# Patient Record
Sex: MALE | Race: BLACK OR AFRICAN AMERICAN | NOT HISPANIC OR LATINO | Employment: OTHER | URBAN - METROPOLITAN AREA
[De-identification: names, ages, dates, MRNs, and addresses within clinical notes are randomized per-mention and may not be internally consistent; named-entity substitution may affect disease eponyms.]

---

## 2021-05-02 ENCOUNTER — HOSPITAL ENCOUNTER (EMERGENCY)
Facility: HOSPITAL | Age: 56
Discharge: PSYCHIATRIC OR SUBSTANCE USE FACILITY | End: 2021-05-02
Attending: EMERGENCY MEDICINE
Payer: MEDICAID

## 2021-05-02 VITALS
SYSTOLIC BLOOD PRESSURE: 117 MMHG | TEMPERATURE: 98 F | HEART RATE: 83 BPM | OXYGEN SATURATION: 96 % | DIASTOLIC BLOOD PRESSURE: 72 MMHG | RESPIRATION RATE: 15 BRPM

## 2021-05-02 DIAGNOSIS — R07.9 CHEST PAIN: ICD-10-CM

## 2021-05-02 DIAGNOSIS — R31.9 HEMATURIA, UNSPECIFIED TYPE: ICD-10-CM

## 2021-05-02 DIAGNOSIS — R45.851 SUICIDAL IDEATION: Primary | ICD-10-CM

## 2021-05-02 LAB
ALBUMIN SERPL BCP-MCNC: 4 G/DL (ref 3.5–5.2)
ALP SERPL-CCNC: 65 U/L (ref 55–135)
ALT SERPL W/O P-5'-P-CCNC: 17 U/L (ref 10–44)
AMPHET+METHAMPHET UR QL: NEGATIVE
ANION GAP SERPL CALC-SCNC: 15 MMOL/L (ref 8–16)
APAP SERPL-MCNC: <3 UG/ML (ref 10–20)
AST SERPL-CCNC: 42 U/L (ref 10–40)
BACTERIA #/AREA URNS AUTO: ABNORMAL /HPF
BARBITURATES UR QL SCN>200 NG/ML: NEGATIVE
BASOPHILS # BLD AUTO: 0.03 K/UL (ref 0–0.2)
BASOPHILS NFR BLD: 0.7 % (ref 0–1.9)
BENZODIAZ UR QL SCN>200 NG/ML: ABNORMAL
BILIRUB SERPL-MCNC: 0.6 MG/DL (ref 0.1–1)
BILIRUB UR QL STRIP: NEGATIVE
BNP SERPL-MCNC: 12 PG/ML (ref 0–99)
BUN SERPL-MCNC: 22 MG/DL (ref 6–20)
BZE UR QL SCN: ABNORMAL
CALCIUM SERPL-MCNC: 9.5 MG/DL (ref 8.7–10.5)
CANNABINOIDS UR QL SCN: ABNORMAL
CHLORIDE SERPL-SCNC: 104 MMOL/L (ref 95–110)
CLARITY UR REFRACT.AUTO: ABNORMAL
CO2 SERPL-SCNC: 21 MMOL/L (ref 23–29)
COLOR UR AUTO: ABNORMAL
CREAT SERPL-MCNC: 1.1 MG/DL (ref 0.5–1.4)
CREAT UR-MCNC: 412 MG/DL (ref 23–375)
CTP QC/QA: YES
DIFFERENTIAL METHOD: ABNORMAL
EOSINOPHIL # BLD AUTO: 0.1 K/UL (ref 0–0.5)
EOSINOPHIL NFR BLD: 2.5 % (ref 0–8)
ERYTHROCYTE [DISTWIDTH] IN BLOOD BY AUTOMATED COUNT: 15.4 % (ref 11.5–14.5)
EST. GFR  (AFRICAN AMERICAN): >60 ML/MIN/1.73 M^2
EST. GFR  (NON AFRICAN AMERICAN): >60 ML/MIN/1.73 M^2
ETHANOL SERPL-MCNC: <10 MG/DL
ETHANOL UR-MCNC: <10 MG/DL
GLUCOSE SERPL-MCNC: 100 MG/DL (ref 70–110)
GLUCOSE UR QL STRIP: NEGATIVE
HCT VFR BLD AUTO: 37.4 % (ref 40–54)
HGB BLD-MCNC: 11.9 G/DL (ref 14–18)
HGB UR QL STRIP: ABNORMAL
HYALINE CASTS UR QL AUTO: 1 /LPF
IMM GRANULOCYTES # BLD AUTO: 0.02 K/UL (ref 0–0.04)
IMM GRANULOCYTES NFR BLD AUTO: 0.5 % (ref 0–0.5)
KETONES UR QL STRIP: ABNORMAL
LEUKOCYTE ESTERASE UR QL STRIP: ABNORMAL
LYMPHOCYTES # BLD AUTO: 1.4 K/UL (ref 1–4.8)
LYMPHOCYTES NFR BLD: 35.9 % (ref 18–48)
MCH RBC QN AUTO: 23.6 PG (ref 27–31)
MCHC RBC AUTO-ENTMCNC: 31.8 G/DL (ref 32–36)
MCV RBC AUTO: 74 FL (ref 82–98)
METHADONE UR QL SCN>300 NG/ML: NEGATIVE
MICROSCOPIC COMMENT: ABNORMAL
MONOCYTES # BLD AUTO: 0.6 K/UL (ref 0.3–1)
MONOCYTES NFR BLD: 14 % (ref 4–15)
NEUTROPHILS # BLD AUTO: 1.9 K/UL (ref 1.8–7.7)
NEUTROPHILS NFR BLD: 46.4 % (ref 38–73)
NITRITE UR QL STRIP: NEGATIVE
NRBC BLD-RTO: 0 /100 WBC
OPIATES UR QL SCN: NEGATIVE
PCP UR QL SCN>25 NG/ML: NEGATIVE
PH UR STRIP: 5 [PH] (ref 5–8)
PLATELET # BLD AUTO: 258 K/UL (ref 150–450)
PMV BLD AUTO: 10.4 FL (ref 9.2–12.9)
POTASSIUM SERPL-SCNC: 3.5 MMOL/L (ref 3.5–5.1)
PROT SERPL-MCNC: 7.7 G/DL (ref 6–8.4)
PROT UR QL STRIP: ABNORMAL
RBC # BLD AUTO: 5.04 M/UL (ref 4.6–6.2)
RBC #/AREA URNS AUTO: >100 /HPF (ref 0–4)
SALICYLATES SERPL-MCNC: <5 MG/DL (ref 15–30)
SARS-COV-2 RDRP RESP QL NAA+PROBE: NEGATIVE
SODIUM SERPL-SCNC: 140 MMOL/L (ref 136–145)
SP GR UR STRIP: >=1.03 (ref 1–1.03)
T4 FREE SERPL-MCNC: 1.12 NG/DL (ref 0.71–1.51)
TOXICOLOGY INFORMATION: ABNORMAL
TROPONIN I SERPL DL<=0.01 NG/ML-MCNC: 0.01 NG/ML (ref 0–0.03)
TSH SERPL DL<=0.005 MIU/L-ACNC: 4.29 UIU/ML (ref 0.4–4)
URN SPEC COLLECT METH UR: ABNORMAL
WBC # BLD AUTO: 4.01 K/UL (ref 3.9–12.7)
WBC #/AREA URNS AUTO: 16 /HPF (ref 0–5)

## 2021-05-02 PROCEDURE — 84484 ASSAY OF TROPONIN QUANT: CPT | Performed by: EMERGENCY MEDICINE

## 2021-05-02 PROCEDURE — 93005 ELECTROCARDIOGRAM TRACING: CPT

## 2021-05-02 PROCEDURE — 82077 ASSAY SPEC XCP UR&BREATH IA: CPT | Performed by: STUDENT IN AN ORGANIZED HEALTH CARE EDUCATION/TRAINING PROGRAM

## 2021-05-02 PROCEDURE — 63600175 PHARM REV CODE 636 W HCPCS: Performed by: STUDENT IN AN ORGANIZED HEALTH CARE EDUCATION/TRAINING PROGRAM

## 2021-05-02 PROCEDURE — 99285 EMERGENCY DEPT VISIT HI MDM: CPT | Mod: 25

## 2021-05-02 PROCEDURE — 93010 EKG 12-LEAD: ICD-10-PCS | Mod: ,,, | Performed by: INTERNAL MEDICINE

## 2021-05-02 PROCEDURE — 99285 EMERGENCY DEPT VISIT HI MDM: CPT | Mod: CS,,, | Performed by: EMERGENCY MEDICINE

## 2021-05-02 PROCEDURE — 87086 URINE CULTURE/COLONY COUNT: CPT | Performed by: EMERGENCY MEDICINE

## 2021-05-02 PROCEDURE — 87389 HIV-1 AG W/HIV-1&-2 AB AG IA: CPT | Performed by: EMERGENCY MEDICINE

## 2021-05-02 PROCEDURE — 96361 HYDRATE IV INFUSION ADD-ON: CPT

## 2021-05-02 PROCEDURE — 85025 COMPLETE CBC W/AUTO DIFF WBC: CPT | Performed by: EMERGENCY MEDICINE

## 2021-05-02 PROCEDURE — 86803 HEPATITIS C AB TEST: CPT | Performed by: EMERGENCY MEDICINE

## 2021-05-02 PROCEDURE — 81001 URINALYSIS AUTO W/SCOPE: CPT | Performed by: EMERGENCY MEDICINE

## 2021-05-02 PROCEDURE — 84443 ASSAY THYROID STIM HORMONE: CPT | Performed by: EMERGENCY MEDICINE

## 2021-05-02 PROCEDURE — 86703 HIV-1/HIV-2 1 RESULT ANTBDY: CPT | Performed by: EMERGENCY MEDICINE

## 2021-05-02 PROCEDURE — 80143 DRUG ASSAY ACETAMINOPHEN: CPT | Performed by: STUDENT IN AN ORGANIZED HEALTH CARE EDUCATION/TRAINING PROGRAM

## 2021-05-02 PROCEDURE — 51798 US URINE CAPACITY MEASURE: CPT

## 2021-05-02 PROCEDURE — 87536 HIV-1 QUANT&REVRSE TRNSCRPJ: CPT | Performed by: STUDENT IN AN ORGANIZED HEALTH CARE EDUCATION/TRAINING PROGRAM

## 2021-05-02 PROCEDURE — 80307 DRUG TEST PRSMV CHEM ANLYZR: CPT | Performed by: STUDENT IN AN ORGANIZED HEALTH CARE EDUCATION/TRAINING PROGRAM

## 2021-05-02 PROCEDURE — 80053 COMPREHEN METABOLIC PANEL: CPT | Performed by: EMERGENCY MEDICINE

## 2021-05-02 PROCEDURE — 83880 ASSAY OF NATRIURETIC PEPTIDE: CPT | Performed by: EMERGENCY MEDICINE

## 2021-05-02 PROCEDURE — U0002 COVID-19 LAB TEST NON-CDC: HCPCS | Performed by: STUDENT IN AN ORGANIZED HEALTH CARE EDUCATION/TRAINING PROGRAM

## 2021-05-02 PROCEDURE — 25000003 PHARM REV CODE 250: Performed by: EMERGENCY MEDICINE

## 2021-05-02 PROCEDURE — 99285 PR EMERGENCY DEPT VISIT,LEVEL V: ICD-10-PCS | Mod: CS,,, | Performed by: EMERGENCY MEDICINE

## 2021-05-02 PROCEDURE — 93010 ELECTROCARDIOGRAM REPORT: CPT | Mod: ,,, | Performed by: INTERNAL MEDICINE

## 2021-05-02 PROCEDURE — 96374 THER/PROPH/DIAG INJ IV PUSH: CPT

## 2021-05-02 PROCEDURE — 86361 T CELL ABSOLUTE COUNT: CPT | Performed by: STUDENT IN AN ORGANIZED HEALTH CARE EDUCATION/TRAINING PROGRAM

## 2021-05-02 PROCEDURE — 84439 ASSAY OF FREE THYROXINE: CPT | Performed by: EMERGENCY MEDICINE

## 2021-05-02 PROCEDURE — 80179 DRUG ASSAY SALICYLATE: CPT | Performed by: STUDENT IN AN ORGANIZED HEALTH CARE EDUCATION/TRAINING PROGRAM

## 2021-05-02 RX ORDER — ASPIRIN 325 MG
325 TABLET ORAL
Status: COMPLETED | OUTPATIENT
Start: 2021-05-02 | End: 2021-05-02

## 2021-05-02 RX ORDER — LORAZEPAM 2 MG/ML
0.5 INJECTION INTRAMUSCULAR ONCE
Status: COMPLETED | OUTPATIENT
Start: 2021-05-02 | End: 2021-05-02

## 2021-05-02 RX ORDER — LORAZEPAM 1 MG/1
1 TABLET ORAL
Status: DISCONTINUED | OUTPATIENT
Start: 2021-05-02 | End: 2021-05-02

## 2021-05-02 RX ADMIN — LORAZEPAM 0.5 MG: 2 INJECTION INTRAMUSCULAR; INTRAVENOUS at 04:05

## 2021-05-02 RX ADMIN — SODIUM CHLORIDE, SODIUM LACTATE, POTASSIUM CHLORIDE, AND CALCIUM CHLORIDE 1000 ML: .6; .31; .03; .02 INJECTION, SOLUTION INTRAVENOUS at 04:05

## 2021-05-02 RX ADMIN — ASPIRIN 325 MG ORAL TABLET 325 MG: 325 PILL ORAL at 04:05

## 2021-05-03 LAB
BACTERIA UR CULT: NO GROWTH
CD3+CD4+ CELLS # BLD: 75 CELLS/UL (ref 300–1400)
CD3+CD4+ CELLS NFR BLD: 6.2 % (ref 28–57)
HCV AB SERPL QL IA: NEGATIVE
HIV 1+2 AB+HIV1 P24 AG SERPL QL IA: POSITIVE
HIV SUPPLEMENTAL ASSAY INTERPRETATION: ABNORMAL
HIV-1 RESULT: POSITIVE
HIV-2 RESULT: NEGATIVE

## 2021-05-05 LAB — HIV1 RNA # PLAS NAA DL=20: 4940 COPIES/ML

## 2021-06-14 PROBLEM — F33.42 RECURRENT MAJOR DEPRESSIVE DISORDER, IN FULL REMISSION: Status: ACTIVE | Noted: 2021-06-14

## 2021-06-14 PROBLEM — M79.645 PAIN OF LEFT MIDDLE FINGER: Status: ACTIVE | Noted: 2021-06-14

## 2021-06-14 PROBLEM — Z86.39 HISTORY OF DIABETES MELLITUS: Status: ACTIVE | Noted: 2021-06-14

## 2021-06-14 PROBLEM — G47.00 INSOMNIA: Status: ACTIVE | Noted: 2021-06-14

## 2021-06-14 PROBLEM — F29 PSYCHOSIS: Status: ACTIVE | Noted: 2021-06-14

## 2021-06-14 PROBLEM — Z21 ASYMPTOMATIC HIV INFECTION: Status: ACTIVE | Noted: 2021-06-14

## 2021-06-14 PROBLEM — Z86.19 HISTORY OF SYPHILIS: Status: ACTIVE | Noted: 2021-06-14

## 2021-07-21 PROBLEM — Z76.0 ENCOUNTER FOR MEDICATION REFILL: Status: ACTIVE | Noted: 2021-07-21

## 2021-08-18 ENCOUNTER — PATIENT OUTREACH (OUTPATIENT)
Dept: ADMINISTRATIVE | Facility: HOSPITAL | Age: 56
End: 2021-08-18

## 2021-08-18 DIAGNOSIS — Z12.12 ENCOUNTER FOR COLORECTAL CANCER SCREENING: Primary | ICD-10-CM

## 2021-08-18 DIAGNOSIS — Z12.11 ENCOUNTER FOR COLORECTAL CANCER SCREENING: Primary | ICD-10-CM

## 2022-03-23 PROBLEM — E11.65 TYPE 2 DIABETES MELLITUS WITH HYPERGLYCEMIA, WITHOUT LONG-TERM CURRENT USE OF INSULIN: Status: ACTIVE | Noted: 2022-03-23

## 2022-03-23 PROBLEM — F17.210 CIGARETTE NICOTINE DEPENDENCE WITHOUT COMPLICATION: Status: ACTIVE | Noted: 2022-03-23

## 2022-03-23 PROBLEM — M62.82 NON-TRAUMATIC RHABDOMYOLYSIS: Status: ACTIVE | Noted: 2022-03-23

## 2022-03-23 PROBLEM — F33.2 SEVERE EPISODE OF RECURRENT MAJOR DEPRESSIVE DISORDER: Status: ACTIVE | Noted: 2021-06-14

## 2022-03-23 PROBLEM — F14.10 COCAINE ABUSE: Status: ACTIVE | Noted: 2022-03-23

## 2022-03-23 PROBLEM — M19.012 LOCALIZED OSTEOARTHRITIS OF LEFT SHOULDER: Status: ACTIVE | Noted: 2022-03-23

## 2022-03-23 PROBLEM — E87.6 HYPOKALEMIA: Status: ACTIVE | Noted: 2022-03-23

## 2022-03-24 PROBLEM — F33.3 SEVERE EPISODE OF RECURRENT MAJOR DEPRESSIVE DISORDER, WITH PSYCHOTIC FEATURES: Status: ACTIVE | Noted: 2021-06-14

## 2022-04-16 PROBLEM — F17.200 TOBACCO USE DISORDER: Status: ACTIVE | Noted: 2022-04-16

## 2022-04-16 PROBLEM — F15.90 STIMULANT USE DISORDER: Status: ACTIVE | Noted: 2022-04-16

## 2022-04-19 PROBLEM — F33.2 MDD (MAJOR DEPRESSIVE DISORDER), RECURRENT EPISODE, SEVERE: Chronic | Status: ACTIVE | Noted: 2021-06-14

## 2022-04-19 PROBLEM — F33.3 SEVERE EPISODE OF RECURRENT MAJOR DEPRESSIVE DISORDER, WITH PSYCHOTIC FEATURES: Chronic | Status: ACTIVE | Noted: 2021-06-14

## 2022-08-31 PROBLEM — S02.652B FRACTURE OF ANGLE OF LEFT MANDIBLE, INITIAL ENCOUNTER FOR OPEN FRACTURE: Status: ACTIVE | Noted: 2022-08-31

## 2023-04-20 ENCOUNTER — HOSPITAL ENCOUNTER (EMERGENCY)
Facility: OTHER | Age: 58
Discharge: HOME | End: 2023-04-20
Attending: EMERGENCY MEDICINE
Payer: MEDICAID

## 2023-04-20 VITALS
BODY MASS INDEX: 20.18 KG/M2 | TEMPERATURE: 99 F | RESPIRATION RATE: 17 BRPM | WEIGHT: 149 LBS | HEIGHT: 72 IN | DIASTOLIC BLOOD PRESSURE: 55 MMHG | OXYGEN SATURATION: 98 % | SYSTOLIC BLOOD PRESSURE: 107 MMHG | HEART RATE: 92 BPM

## 2023-04-20 DIAGNOSIS — E11.49 OTHER DIABETIC NEUROLOGICAL COMPLICATION ASSOCIATED WITH TYPE 2 DIABETES MELLITUS: Primary | ICD-10-CM

## 2023-04-20 DIAGNOSIS — B35.3 TINEA PEDIS, UNSPECIFIED LATERALITY: ICD-10-CM

## 2023-04-20 DIAGNOSIS — M79.673 FOOT PAIN: ICD-10-CM

## 2023-04-20 LAB — POCT GLUCOSE: 85 MG/DL (ref 70–110)

## 2023-04-20 PROCEDURE — 25000003 PHARM REV CODE 250: Performed by: PHYSICIAN ASSISTANT

## 2023-04-20 PROCEDURE — 99283 EMERGENCY DEPT VISIT LOW MDM: CPT

## 2023-04-20 PROCEDURE — 82962 GLUCOSE BLOOD TEST: CPT

## 2023-04-20 RX ORDER — GABAPENTIN 300 MG/1
600 CAPSULE ORAL 3 TIMES DAILY
Status: DISCONTINUED | OUTPATIENT
Start: 2023-04-20 | End: 2023-04-20 | Stop reason: HOSPADM

## 2023-04-20 RX ORDER — DICLOFENAC SODIUM 10 MG/G
2 GEL TOPICAL 4 TIMES DAILY
Qty: 20 G | Refills: 0 | Status: ON HOLD | OUTPATIENT
Start: 2023-04-20 | End: 2023-09-18 | Stop reason: HOSPADM

## 2023-04-20 RX ORDER — GABAPENTIN 300 MG/1
600 CAPSULE ORAL 3 TIMES DAILY
Qty: 180 CAPSULE | Refills: 0 | Status: ON HOLD | OUTPATIENT
Start: 2023-04-20 | End: 2023-09-18 | Stop reason: HOSPADM

## 2023-04-20 RX ORDER — KETOROLAC TROMETHAMINE 10 MG/1
10 TABLET, FILM COATED ORAL
Status: COMPLETED | OUTPATIENT
Start: 2023-04-20 | End: 2023-04-20

## 2023-04-20 RX ADMIN — GABAPENTIN 600 MG: 300 CAPSULE ORAL at 02:04

## 2023-04-20 RX ADMIN — KETOROLAC TROMETHAMINE 10 MG: 10 TABLET, FILM COATED ORAL at 02:04

## 2023-04-20 NOTE — ED PROVIDER NOTES
Source of History:  Patient    Chief complaint:  Foot Swelling (Referred from [facility] c/o right foot pain/swelling that radiates to right calf x 1 month. Denies PMH DVT. No swelling or redness noted. -SOB Denies any other complaints. VSS)      HPI:  Vinicius Atkinson is a 57 y.o. male presenting with past medical history of HIV on antiviral, diabetes mellitus controlled with metformin, history of neuropathy and previous polysubstance abuse with complaints of bilateral foot pain with prevalence to right plantar foot.  Denies any recent falls or trauma.  Denies any concern of retained objects.  Reports he was previously on gabapentin however since a resident of [facility] is no longer taking this medication.  Does describe the pain as sharp stabbing jolts.  Also reports some pins and needle sensation.  Denies any chest pain, calf pain or pain radiation.    This is the extent to the patients complaints today here in the emergency department.    ROS: As per HPI and below:  Constitutional: No fever.  No chills.  Eyes: No visual changes.   ENT: No sore throat. No ear pain.  Urinary: No abnormal urination.  MSK:  Bilateral foot pain  Integument: No rashes or lesions.    Review of patient's allergies indicates:   Allergen Reactions    Tramadol Diarrhea       PMH:  As per HPI and below:  Past Medical History:   Diagnosis Date    Acid reflux     Addiction to drug     Anxiety     Depression     Diabetes mellitus, type 2     History of psychiatric hospitalization     HIV infection     Hx of psychiatric care     Tori     Psychiatric exam requested by authority     Psychiatric problem     Substance abuse     Suicide attempt     Therapy      Past Surgical History:   Procedure Laterality Date    LEG SURGERY      ORIF MANDIBULAR FRACTURE Left 9/9/2022    Procedure: ORIF, FRACTURE, MANDIBLE Reviewed by ROHITH 09/06/22 1201;  Surgeon: Trenton Drew DDS, MD;  Location: [facility] MAIN OR;  Service: Oral Surgery;  Laterality: Left;       Social  History     Tobacco Use    Smoking status: Every Day     Packs/day: 1.00     Years: 10.00     Pack years: 10.00     Types: Cigarettes    Smokeless tobacco: Never   Substance Use Topics    Alcohol use: Not Currently    Drug use: Yes     Types: Marijuana, Cocaine, Heroin     Comment: Last used 6 months ago       Physical Exam:    BP (!) 107/55 (BP Location: Left arm, Patient Position: Sitting)   Pulse 92   Temp 98.5 °F (36.9 °C) (Oral)   Resp 17   Ht 6' (1.829 m)   Wt 67.6 kg (149 lb)   SpO2 98%   BMI 20.21 kg/m²   Nursing note and vital signs reviewed.  Constitutional: No acute distress.  Eyes: No conjunctival injection.  Extraocular muscles are intact.  ENT: Normal phonation.  Musculoskeletal:  Fair range of motion of bilateral lower extremities.  Noted pes planus with tinea like rash to the plantar aspects of both feet and interdigit spaces.  No erythema or edema.  Sensation grossly intact.  Distal pulses intact.  Skin: + rashes seen.  Good turgor.  No abrasions.  No ecchymoses.  Psych: Appropriate, conversant.        I decided to obtain the patient's medical records.    Results for orders placed or performed during the hospital encounter of 04/20/23   POCT glucose   Result Value Ref Range    POCT Glucose 85 70 - 110 mg/dL     Imaging Results              X-Ray Foot Complete Right (Final result)  Result time 04/20/23 12:52:33      Final result by Ze Bolanos MD (04/20/23 12:52:33)                   Impression:      See above      Electronically signed by: Ze Bolanos MD  Date:    04/20/2023  Time:    12:52               Narrative:    EXAMINATION:  XR FOOT COMPLETE 3 VIEW RIGHT    CLINICAL HISTORY:  . Pain in unspecified foot    TECHNIQUE:  AP, lateral, and oblique views of the right foot were performed.    COMPARISON:  None    FINDINGS:  Phalanges metatarsals and tarsal bones are intact.  Mild degenerative changes seen at the 1st MP joint.                                      MDM:    57 y.o.  male with bilateral foot pain.  Physical exam reveals patient well appearing in no distress. Fair range of motion of bilateral lower extremities.  Noted pes planus with tinea like rash to the plantar aspects of both feet and interdigit spaces.  No erythema or edema.  Sensation grossly intact.  Distal pulses intact.    DDX: fracture, sprain, dislocation, neuropathy, cellulitis, tinea, gouty arthritis    ED management: x-ray showing no acute bony process; Toradol given in the ED. It appears patient has been DC from gabapentin and he was unaware.  Emphasized that this is likely the best treatment for his suspected neuropathy.  No further workup felt warranted here as no recent trauma and no findings to suggest acute emergent process or acute infectious process.  We did discuss he has tinea and will also give referral to Podiatry given    Impression/Plan: The primary encounter diagnosis was Other diabetic neurological complication associated with type 2 diabetes mellitus. Diagnoses of Foot pain and Tinea pedis, unspecified laterality were also pertinent to this visit.   Patient cautioned on when to return to ED.  Pt. Understands and agrees with current treatment plan      Diagnostic Impression:    1. Other diabetic neurological complication associated with type 2 diabetes mellitus    2. Foot pain    3. Tinea pedis, unspecified laterality         ED Disposition Condition    Discharge Stable            ED Prescriptions       Medication Sig Dispense Start Date End Date Auth. Provider    gabapentin (NEURONTIN) 300 MG capsule Take 2 capsules (600 mg total) by mouth 3 (three) times daily. 180 capsule 4/20/2023 5/20/2023 RAGHAV Serrato    diclofenac sodium (VOLTAREN) 1 % Gel Apply 2 g topically 4 (four) times daily. 20 g 4/20/2023 -- RAGHAV Serrato          Follow-up Information       Follow up With Specialties Details Why Contact Info    [facility]  Schedule an  appointment as soon as possible for a visit   [facility] [address]            Please pardon typos or dictation errors, as this note was transcribed using [employer] flu[employer] direct dictation software.      RAGHAV Serrato  04/20/23 9683

## 2023-04-20 NOTE — ED TRIAGE NOTES
C/o right foot pain and swelling for the last month. Denies injury. Reports intermittent sharp pain.

## 2023-04-20 NOTE — FIRST PROVIDER EVALUATION
" Emergency Department TeleTriage Encounter Note      CHIEF COMPLAINT    Chief Complaint   Patient presents with    Foot Swelling     Referred from [facility] c/o right foot pain/swelling that radiates to right calf x 1 month. Denies PMH DVT. No swelling or redness noted. -SOB Denies any other complaints. VSS       VITAL SIGNS   Initial Vitals [04/20/23 1129]   BP Pulse Resp Temp SpO2   (!) 107/55 92 17 98.5 °F (36.9 °C) 98 %      MAP       --            ALLERGIES    Review of patient's allergies indicates:   Allergen Reactions    Tramadol Diarrhea       PROVIDER TRIAGE NOTE  This is a teletriage evaluation of a 57 y.o. male presenting to the ED complaining of foot pain. Patient reports right foot pain that radiates into right calf. Symptoms have been present for "months." He has not taken any medications. He denies trauma or injury.     Patient is alert and oriented. He speaks in complete sentences. He is sitting upright in the chair in no distress. Per triage note, no swelling noted.     Initial orders will be placed and care will be transferred to an alternate provider when patient is roomed for a full evaluation. Any additional orders and the final disposition will be determined by that provider.         ORDERS  Labs Reviewed - No data to display    ED Orders (720h ago, onward)      None              Virtual Visit Note: The provider triage portion of this emergency department evaluation and documentation was performed via [employer], a HIPAA-compliant telemedicine application, in concert with a tele-presenter in the room. A face to face patient evaluation with one of my colleagues will occur once the patient is placed in an emergency department room.      DISCLAIMER: This note was prepared with [employer] voice recognition transcription software. Garbled syntax, mangled pronouns, and other bizarre constructions may be attributed to that software system.    "

## 2023-04-21 ENCOUNTER — TELEPHONE (OUTPATIENT)
Dept: ORTHOPEDICS | Facility: CLINIC | Age: 58
End: 2023-04-21
Payer: MEDICAID

## 2023-06-07 ENCOUNTER — HOSPITAL ENCOUNTER (EMERGENCY)
Facility: OTHER | Age: 58
Discharge: HOME | End: 2023-06-07
Attending: EMERGENCY MEDICINE
Payer: MEDICAID

## 2023-06-07 VITALS
RESPIRATION RATE: 18 BRPM | HEIGHT: 72 IN | HEART RATE: 89 BPM | SYSTOLIC BLOOD PRESSURE: 147 MMHG | OXYGEN SATURATION: 95 % | TEMPERATURE: 98 F | BODY MASS INDEX: 21.67 KG/M2 | WEIGHT: 160 LBS | DIASTOLIC BLOOD PRESSURE: 86 MMHG

## 2023-06-07 DIAGNOSIS — M79.671 PAIN IN BOTH FEET: Primary | ICD-10-CM

## 2023-06-07 DIAGNOSIS — M79.672 PAIN IN BOTH FEET: Primary | ICD-10-CM

## 2023-06-07 LAB
ALBUMIN SERPL BCP-MCNC: 3.9 G/DL (ref 3.5–5.2)
ALP SERPL-CCNC: 75 U/L (ref 55–135)
ALT SERPL W/O P-5'-P-CCNC: 37 U/L (ref 10–44)
ANION GAP SERPL CALC-SCNC: 10 MMOL/L (ref 8–16)
AST SERPL-CCNC: 57 U/L (ref 10–40)
BASOPHILS # BLD AUTO: 0.03 K/UL (ref 0–0.2)
BASOPHILS NFR BLD: 0.6 % (ref 0–1.9)
BILIRUB SERPL-MCNC: 0.6 MG/DL (ref 0.1–1)
BNP SERPL-MCNC: <10 PG/ML (ref 0–99)
BUN SERPL-MCNC: 15 MG/DL (ref 6–20)
CALCIUM SERPL-MCNC: 9.2 MG/DL (ref 8.7–10.5)
CHLORIDE SERPL-SCNC: 101 MMOL/L (ref 95–110)
CO2 SERPL-SCNC: 27 MMOL/L (ref 23–29)
CREAT SERPL-MCNC: 1.2 MG/DL (ref 0.5–1.4)
DIFFERENTIAL METHOD: ABNORMAL
EOSINOPHIL # BLD AUTO: 0.3 K/UL (ref 0–0.5)
EOSINOPHIL NFR BLD: 5.4 % (ref 0–8)
ERYTHROCYTE [DISTWIDTH] IN BLOOD BY AUTOMATED COUNT: 16.1 % (ref 11.5–14.5)
EST. GFR  (NO RACE VARIABLE): >60 ML/MIN/1.73 M^2
GLUCOSE SERPL-MCNC: 107 MG/DL (ref 70–110)
HCT VFR BLD AUTO: 38.5 % (ref 40–54)
HGB BLD-MCNC: 12.3 G/DL (ref 14–18)
IMM GRANULOCYTES # BLD AUTO: 0.01 K/UL (ref 0–0.04)
IMM GRANULOCYTES NFR BLD AUTO: 0.2 % (ref 0–0.5)
LYMPHOCYTES # BLD AUTO: 1.9 K/UL (ref 1–4.8)
LYMPHOCYTES NFR BLD: 39.6 % (ref 18–48)
MCH RBC QN AUTO: 23.7 PG (ref 27–31)
MCHC RBC AUTO-ENTMCNC: 31.9 G/DL (ref 32–36)
MCV RBC AUTO: 74 FL (ref 82–98)
MONOCYTES # BLD AUTO: 0.4 K/UL (ref 0.3–1)
MONOCYTES NFR BLD: 9.4 % (ref 4–15)
NEUTROPHILS # BLD AUTO: 2.1 K/UL (ref 1.8–7.7)
NEUTROPHILS NFR BLD: 44.8 % (ref 38–73)
NRBC BLD-RTO: 0 /100 WBC
PLATELET # BLD AUTO: 215 K/UL (ref 150–450)
PMV BLD AUTO: 10 FL (ref 9.2–12.9)
POTASSIUM SERPL-SCNC: 3.5 MMOL/L (ref 3.5–5.1)
PROT SERPL-MCNC: 7.1 G/DL (ref 6–8.4)
RBC # BLD AUTO: 5.19 M/UL (ref 4.6–6.2)
SODIUM SERPL-SCNC: 138 MMOL/L (ref 136–145)
TROPONIN I SERPL DL<=0.01 NG/ML-MCNC: <0.006 NG/ML (ref 0–0.03)
WBC # BLD AUTO: 4.67 K/UL (ref 3.9–12.7)

## 2023-06-07 PROCEDURE — 93005 ELECTROCARDIOGRAM TRACING: CPT

## 2023-06-07 PROCEDURE — 93010 ELECTROCARDIOGRAM REPORT: CPT | Mod: ,,, | Performed by: INTERNAL MEDICINE

## 2023-06-07 PROCEDURE — 85025 COMPLETE CBC W/AUTO DIFF WBC: CPT | Performed by: PHYSICIAN ASSISTANT

## 2023-06-07 PROCEDURE — 99285 EMERGENCY DEPT VISIT HI MDM: CPT

## 2023-06-07 PROCEDURE — 25000003 PHARM REV CODE 250: Performed by: EMERGENCY MEDICINE

## 2023-06-07 PROCEDURE — 84484 ASSAY OF TROPONIN QUANT: CPT | Performed by: PHYSICIAN ASSISTANT

## 2023-06-07 PROCEDURE — 83880 ASSAY OF NATRIURETIC PEPTIDE: CPT | Performed by: PHYSICIAN ASSISTANT

## 2023-06-07 PROCEDURE — 80053 COMPREHEN METABOLIC PANEL: CPT | Performed by: PHYSICIAN ASSISTANT

## 2023-06-07 PROCEDURE — 93010 EKG 12-LEAD: ICD-10-PCS | Mod: ,,, | Performed by: INTERNAL MEDICINE

## 2023-06-07 RX ORDER — IBUPROFEN 600 MG/1
600 TABLET ORAL
Status: COMPLETED | OUTPATIENT
Start: 2023-06-07 | End: 2023-06-07

## 2023-06-07 RX ORDER — IBUPROFEN 600 MG/1
600 TABLET ORAL EVERY 6 HOURS PRN
Qty: 20 TABLET | Refills: 0 | Status: SHIPPED | OUTPATIENT
Start: 2023-06-07

## 2023-06-07 RX ADMIN — IBUPROFEN 600 MG: 600 TABLET, FILM COATED ORAL at 06:06

## 2023-06-07 NOTE — FIRST PROVIDER EVALUATION
Emergency Department TeleTriage Encounter Note      CHIEF COMPLAINT    Chief Complaint   Patient presents with    Foot Swelling     Pt c/o b/l foot swelling and pain. Unable to appreciate on triage exam. States he is a diabetic, denies any wounds to his feet.       VITAL SIGNS   Initial Vitals [06/07/23 1608]   BP Pulse Resp Temp SpO2   124/73 88 19 98.4 °F (36.9 °C) 97 %      MAP       --            ALLERGIES    Review of patient's allergies indicates:   Allergen Reactions    Tramadol Diarrhea       PROVIDER TRIAGE NOTE  Patient presents with complaint of bilateral feet swelling. With redness. Also reports fever and shortness of breath.       Phy:   Constitutional: well nourished, well developed, appearing stated age, NAD   HEENT: NCAT, symmetrical lids, No obvious facial deformity.  Normal phonation. Normal Conjunctiva   Neck: NAROM   Respiratory: Normal effort.  No obvious use of accessory muscles   Musculoskeletal: Moved upper extremities well   Neuro: Alert, answers questions appropriately    Psych: appropriate mood and affect      Initial orders will be placed and care will be transferred to an alternate provider when patient is roomed for a full evaluation. Any additional orders and the final disposition will be determined by that provider.        ORDERS  Labs Reviewed - No data to display    ED Orders (720h ago, onward)      Start Ordered     Status Ordering Provider    06/07/23 1613 06/07/23 1612  Cardiac Monitoring - Adult  Continuous        Comments: Notify Physician If:    Ordered DANIELA BURGOS    06/07/23 1613 06/07/23 1612  Pulse Oximetry Continuous  Continuous         Ordered NEGDANIELA DURAN    Unscheduled 06/07/23 1612  Vital signs  Every 30 min         Ordered DANIELA BURGOS    Unscheduled 06/07/23 1612  Insert peripheral IV  Once         Ordered NEGDANIELA DURAN    Unscheduled 06/07/23 1612  CBC auto differential  STAT         Ordered NEGROTTO  DANIELA SALGUERO    Unscheduled 06/07/23 1612  Comprehensive metabolic panel  STAT         Ordered DANIELA BURGOS    Unscheduled 06/07/23 1612  Troponin I  STAT         Ordered DANIELA BURGOS    Unscheduled 06/07/23 1612  Brain natriuretic peptide  STAT         Ordered DANIELA BURGOS    Unscheduled 06/07/23 1612  EKG 12-lead  Once         Ordered DANIELA BURGOS    Unscheduled 06/07/23 1612  X-Ray Chest AP Portable  1 time imaging         Ordered DANIELA BURGOS              Virtual Visit Note: The provider triage portion of this emergency department evaluation and documentation was performed via [employer], a HIPAA-compliant telemedicine application, in concert with a tele-presenter in the room. A face to face patient evaluation with one of my colleagues will occur once the patient is placed in an emergency department room.      DISCLAIMER: This note was prepared with [employer] voice recognition transcription software. Garbled syntax, mangled pronouns, and other bizarre constructions may be attributed to that software system.

## 2023-06-07 NOTE — DISCHARGE INSTRUCTIONS
You have evidence of chronic bony changes in your feet called arthritis.  Continue taking Neurontin.  You may take ibuprofen as needed for pain.    You may follow-up with Podiatry for further evaluation.    Return to the ER for any worsening symptoms.

## 2023-06-07 NOTE — ED PROVIDER NOTES
Encounter Date: 6/7/2023       History     Chief Complaint   Patient presents with    Foot Swelling     Pt c/o b/l foot swelling and pain. Unable to appreciate on triage exam. States he is a diabetic, denies any wounds to his feet.     56 yo M with pmhx AIDS (CD4 183, Sept 2021) on HAART, DM, anxiety, depression, tori, GERD, diabetic neuropathy presents with bilateral foot pain and swelling.  He notes symptoms have been present for 1 week.  They are severe.  He initially stated he is not taking any medications for the pain but then did admit to using gabapentin without relief.  No trauma.  He has been walking on his feet a lot.  Was able to ambulate to the ED.  He reports swelling is more severe at the end of the day.  No wounds or trauma.  No fevers.  He reports compliance with his Biktarvy.  Patient had an ED encounter several months ago with similar symptoms and was started on Neurontin.  He has not seen his PCP for the symptoms.  No shortness of breath.      Review of patient's allergies indicates:   Allergen Reactions    Tramadol Diarrhea     Past Medical History:   Diagnosis Date    Acid reflux     Addiction to drug     Anxiety     Depression     Diabetes mellitus, type 2     History of psychiatric hospitalization     HIV infection     Hx of psychiatric care     Tori     Psychiatric exam requested by authority     Psychiatric problem     Substance abuse     Suicide attempt     Therapy      Past Surgical History:   Procedure Laterality Date    LEG SURGERY      ORIF MANDIBULAR FRACTURE Left 9/9/2022    Procedure: ORIF, FRACTURE, MANDIBLE Reviewed by ROHITH 09/06/22 1201;  Surgeon: Trenton Drew DDS, MD;  Location: [facility] MAIN OR;  Service: Oral Surgery;  Laterality: Left;     Family History   Problem Relation Age of Onset    Colon cancer Mother     No Known Problems Father      Social History     Tobacco Use    Smoking status: Every Day     Packs/day: 1.00     Years: 10.00     Pack years: 10.00     Types: Cigarettes     Smokeless tobacco: Never   Substance Use Topics    Alcohol use: Not Currently    Drug use: Yes     Types: Marijuana, Cocaine, Heroin     Comment: Last used 6 months ago     Review of Systems    Physical Exam     Initial Vitals [06/07/23 1608]   BP Pulse Resp Temp SpO2   124/73 88 19 98.4 °F (36.9 °C) 97 %      MAP       --         Physical Exam    Nursing note and vitals reviewed.  Constitutional: He appears well-developed and well-nourished. He is not diaphoretic. No distress.   HENT:   Head: Normocephalic and atraumatic.   Eyes: Right eye exhibits no discharge. Left eye exhibits no discharge. No scleral icterus.   Neck: Neck supple. No JVD present.   Normal range of motion.  Cardiovascular:  Normal rate, regular rhythm and normal heart sounds.     Exam reveals no gallop and no friction rub.       No murmur heard.  Pulses:       Dorsalis pedis pulses are 2+ on the right side and 2+ on the left side.        Posterior tibial pulses are 2+ on the right side and 2+ on the left side.   Pulmonary/Chest: Breath sounds normal. No respiratory distress. He has no wheezes. He has no rhonchi. He has no rales. He exhibits no tenderness.   Abdominal: Abdomen is soft. Bowel sounds are normal. He exhibits no distension and no mass. There is no abdominal tenderness. There is no rebound and no guarding.   Musculoskeletal:         General: No tenderness. Normal range of motion.      Cervical back: Normal range of motion and neck supple.      Comments: Bilateral feet warm and well-perfused  No ulcers, skin discoloration, ecchymosis  Diffuse tenderness palpation throughout bilat feet without any crepitus     Neurological: He is oriented to person, place, and time. He has normal strength. No sensory deficit.   Awake and oriented   Skin: Skin is warm and dry. Capillary refill takes less than 2 seconds.   Psychiatric: His speech is slurred.       ED Course   Procedures  Labs Reviewed   CBC W/ AUTO DIFFERENTIAL - Abnormal; Notable for  the following components:       Result Value    Hemoglobin 12.3 (*)     Hematocrit 38.5 (*)     MCV 74 (*)     MCH 23.7 (*)     MCHC 31.9 (*)     RDW 16.1 (*)     All other components within normal limits   COMPREHENSIVE METABOLIC PANEL - Abnormal; Notable for the following components:    AST 57 (*)     All other components within normal limits   TROPONIN I   B-TYPE NATRIURETIC PEPTIDE     EKG Readings: (Independently Interpreted)   Initial Reading: No STEMI. Rhythm: Normal Sinus Rhythm. ST Segments: Normal ST Segments. T Waves Flipped: AVL. Axis: Normal.     Imaging Results              X-Ray Foot Complete Bilateral (Final result)  Result time 06/07/23 17:50:27      Final result by Oliva Jordan MD (06/07/23 17:50:27)                   Impression:      No acute bony abnormality detected.  As above described.      Electronically signed by: Oliva Jordan  Date:    06/07/2023  Time:    17:50               Narrative:    EXAMINATION:  XR FOOT COMPLETE THREE VIEW BILATERAL    CLINICAL HISTORY:  Other specified soft tissue disorders    TECHNIQUE:  AP, oblique and lateral view of both feet.    COMPARISON:  Right foot 04/20/2023    FINDINGS:  Three views of the right foot demonstrate no acute fracture or dislocation.  Mild hallux valgus deformity is seen.  There is mild sclerosing at the 1st metatarsophalangeal joint, which may be related to early degenerative change.    Three views of the left foot demonstrate no acute fracture or dislocation.  Mild hallux valgus deformity is seen.                                       Medications   ibuprofen tablet 600 mg (600 mg Oral Given 6/7/23 1800)     Medical Decision Making:   History:   Old Medical Records: I decided to obtain old medical records.  Initial Assessment:   56 yo M with pmhx AIDS (CD4 183, Sept 2021) on HAART, DM, anxiety, depression, nikki, GERD, diabetic neuropathy presents with bilateral foot pain and swelling.  No appreciable swelling on  exam.  Differential Diagnosis:   Stress fracture, overuse injury, CHF, neuropathy    No signs of cellulitis.  No calf asymmetry to suggest DVT.  Strong pulses, neurovascularly intact, doubt any arterial thrombosis or other issues.  Clinical Tests:   Lab Tests: Reviewed  Radiological Study: Ordered  Medical Tests: Reviewed  ED Management:  Will administer ibuprofen.  Labs ordered by provider in triage revealed a normal BNP.  Normal BNP, no appreciable edema, no signs CHF, patient is not obese, this is not CHF.  CMP with normal creatinine, this is not nephrotic syndrome.  CBC without leukocytosis.  Hemoglobin 12.3 within patient's range.  Will obtain plain films of bilateral feet.    Reassessment: Plain films without acute process. Some chronic changes.  On repeat assessment, patient resting comfortably.  I explained that symptoms may possibly be due to diabetic neuropathy but a component of arthritis is also present.  He will benefit from follow-up with Podiatry.  An ambulatory referral was provided.  He is comfortable with that plan.  NSAIDs.  Return precautions.                        Clinical Impression:   Final diagnoses:  [M79.671, M79.672] Pain in both feet (Primary)        ED Disposition Condition    Discharge Stable          ED Prescriptions       Medication Sig Dispense Start Date End Date Auth. Provider    ibuprofen (ADVIL,MOTRIN) 600 MG tablet Take 1 tablet (600 mg total) by mouth every 6 (six) hours as needed for Pain. 20 tablet 6/7/2023 -- John Laureano MD          Follow-up Information       Follow up With Specialties Details Why Contact Info Additional Information    [facility] [address] Podiatry Schedule an appointment as soon as possible for a visit   [address] [facility] [address] [phone] [facility] - Main Building, 5th Floor Please park in [facility] and use Atrium elevator    [facility] - Emergency Dept Emergency Medicine  As needed, If  symptoms worsen [address]  [facility] [address]  [phone]              John Laureano MD  06/07/23 7642

## 2023-06-29 ENCOUNTER — PATIENT OUTREACH (OUTPATIENT)
Dept: ADMINISTRATIVE | Facility: HOSPITAL | Age: 58
End: 2023-06-29
Payer: MEDICAID

## 2023-06-29 DIAGNOSIS — Z12.11 ENCOUNTER FOR COLORECTAL CANCER SCREENING: Primary | ICD-10-CM

## 2023-06-29 DIAGNOSIS — E11.65 TYPE 2 DIABETES MELLITUS WITH HYPERGLYCEMIA, WITHOUT LONG-TERM CURRENT USE OF INSULIN: ICD-10-CM

## 2023-06-29 DIAGNOSIS — Z12.12 ENCOUNTER FOR COLORECTAL CANCER SCREENING: Primary | ICD-10-CM

## 2023-09-14 ENCOUNTER — HOSPITAL ENCOUNTER (INPATIENT)
Facility: OTHER | Age: 58
LOS: 4 days | Discharge: HOME | DRG: 974 | End: 2023-09-18
Attending: EMERGENCY MEDICINE | Admitting: INTERNAL MEDICINE
Payer: MEDICAID

## 2023-09-14 DIAGNOSIS — J96.01 ACUTE HYPOXEMIC RESPIRATORY FAILURE: ICD-10-CM

## 2023-09-14 DIAGNOSIS — R09.02 HYPOXIA: Primary | ICD-10-CM

## 2023-09-14 DIAGNOSIS — B20 HIV INFECTION, UNSPECIFIED SYMPTOM STATUS: ICD-10-CM

## 2023-09-14 DIAGNOSIS — R06.02 SOB (SHORTNESS OF BREATH): ICD-10-CM

## 2023-09-14 DIAGNOSIS — A41.9 SEPSIS: ICD-10-CM

## 2023-09-14 DIAGNOSIS — F17.200 TOBACCO USE DISORDER: ICD-10-CM

## 2023-09-14 DIAGNOSIS — J10.1 INFLUENZA A: ICD-10-CM

## 2023-09-14 PROBLEM — G57.93 NEUROPATHIC PAIN OF BOTH LEGS: Chronic | Status: ACTIVE | Noted: 2023-09-14

## 2023-09-14 PROBLEM — E11.9 TYPE 2 DIABETES MELLITUS, WITHOUT LONG-TERM CURRENT USE OF INSULIN: Chronic | Status: ACTIVE | Noted: 2021-06-14

## 2023-09-14 PROBLEM — Z21 HIV INFECTION: Status: ACTIVE | Noted: 2023-09-14

## 2023-09-14 PROBLEM — R19.7 DIARRHEA OF PRESUMED INFECTIOUS ORIGIN: Status: ACTIVE | Noted: 2023-09-14

## 2023-09-14 LAB
ALBUMIN SERPL BCP-MCNC: 4.1 G/DL (ref 3.5–5.2)
ALP SERPL-CCNC: 50 U/L (ref 55–135)
ALT SERPL W/O P-5'-P-CCNC: 15 U/L (ref 10–44)
AMPHET+METHAMPHET UR QL: NEGATIVE
ANION GAP SERPL CALC-SCNC: 10 MMOL/L (ref 8–16)
AST SERPL-CCNC: 26 U/L (ref 10–40)
BARBITURATES UR QL SCN>200 NG/ML: NEGATIVE
BASOPHILS # BLD AUTO: 0.01 K/UL (ref 0–0.2)
BASOPHILS NFR BLD: 0.2 % (ref 0–1.9)
BENZODIAZ UR QL SCN>200 NG/ML: NEGATIVE
BILIRUB SERPL-MCNC: 0.2 MG/DL (ref 0.1–1)
BILIRUB UR QL STRIP: NEGATIVE
BNP SERPL-MCNC: 19 PG/ML (ref 0–99)
BUN SERPL-MCNC: 15 MG/DL (ref 6–20)
BZE UR QL SCN: NEGATIVE
CALCIUM SERPL-MCNC: 9.4 MG/DL (ref 8.7–10.5)
CANNABINOIDS UR QL SCN: NEGATIVE
CHLORIDE SERPL-SCNC: 103 MMOL/L (ref 95–110)
CLARITY UR: CLEAR
CO2 SERPL-SCNC: 25 MMOL/L (ref 23–29)
COLOR UR: YELLOW
CREAT SERPL-MCNC: 1.4 MG/DL (ref 0.5–1.4)
CREAT UR-MCNC: 145.5 MG/DL (ref 23–375)
CTP QC/QA: YES
CTP QC/QA: YES
DIFFERENTIAL METHOD: ABNORMAL
EOSINOPHIL # BLD AUTO: 0.1 K/UL (ref 0–0.5)
EOSINOPHIL NFR BLD: 1.1 % (ref 0–8)
ERYTHROCYTE [DISTWIDTH] IN BLOOD BY AUTOMATED COUNT: 16.2 % (ref 11.5–14.5)
EST. GFR  (NO RACE VARIABLE): 58 ML/MIN/1.73 M^2
FIO2: 28
GLUCOSE SERPL-MCNC: 90 MG/DL (ref 70–110)
GLUCOSE UR QL STRIP: NEGATIVE
HCO3 UR-SCNC: 23.6 MMOL/L (ref 24–28)
HCT VFR BLD AUTO: 39.5 % (ref 40–54)
HCT VFR BLD CALC: 36 %PCV (ref 36–54)
HGB BLD-MCNC: 12 G/DL
HGB BLD-MCNC: 12.3 G/DL (ref 14–18)
HGB UR QL STRIP: NEGATIVE
IMM GRANULOCYTES # BLD AUTO: 0.03 K/UL (ref 0–0.04)
IMM GRANULOCYTES NFR BLD AUTO: 0.7 % (ref 0–0.5)
KETONES UR QL STRIP: NEGATIVE
LDH SERPL L TO P-CCNC: 1.41 MMOL/L (ref 0.5–2.2)
LDH SERPL L TO P-CCNC: 266 U/L (ref 110–260)
LEUKOCYTE ESTERASE UR QL STRIP: NEGATIVE
LIPASE SERPL-CCNC: 11 U/L (ref 4–60)
LYMPHOCYTES # BLD AUTO: 0.5 K/UL (ref 1–4.8)
LYMPHOCYTES NFR BLD: 10 % (ref 18–48)
MAGNESIUM SERPL-MCNC: 1.7 MG/DL (ref 1.6–2.6)
MCH RBC QN AUTO: 23 PG (ref 27–31)
MCHC RBC AUTO-ENTMCNC: 31.1 G/DL (ref 32–36)
MCV RBC AUTO: 74 FL (ref 82–98)
METHADONE UR QL SCN>300 NG/ML: NEGATIVE
MONOCYTES # BLD AUTO: 0.3 K/UL (ref 0.3–1)
MONOCYTES NFR BLD: 7.3 % (ref 4–15)
NEUTROPHILS # BLD AUTO: 3.6 K/UL (ref 1.8–7.7)
NEUTROPHILS NFR BLD: 80.7 % (ref 38–73)
NITRITE UR QL STRIP: NEGATIVE
NRBC BLD-RTO: 0 /100 WBC
OPIATES UR QL SCN: NEGATIVE
PCO2 BLDA: 42.5 MMHG (ref 35–45)
PCP UR QL SCN>25 NG/ML: NEGATIVE
PH SMN: 7.35 [PH] (ref 7.35–7.45)
PH UR STRIP: 6 [PH] (ref 5–8)
PLATELET # BLD AUTO: 232 K/UL (ref 150–450)
PMV BLD AUTO: 10 FL (ref 9.2–12.9)
PO2 BLDA: 64 MMHG (ref 80–100)
POC BE: -2 MMOL/L
POC IONIZED CALCIUM: 1.18 MMOL/L (ref 1.06–1.42)
POC MOLECULAR INFLUENZA A AGN: POSITIVE
POC MOLECULAR INFLUENZA B AGN: NEGATIVE
POC SATURATED O2: 91 % (ref 95–100)
POC TCO2: 25 MMOL/L (ref 23–27)
POCT GLUCOSE: 99 MG/DL (ref 70–110)
POTASSIUM BLD-SCNC: 3.4 MMOL/L (ref 3.5–5.1)
POTASSIUM SERPL-SCNC: 4.2 MMOL/L (ref 3.5–5.1)
PROCALCITONIN SERPL IA-MCNC: 0.07 NG/ML
PROT SERPL-MCNC: 7.4 G/DL (ref 6–8.4)
PROT UR QL STRIP: NEGATIVE
RBC # BLD AUTO: 5.34 M/UL (ref 4.6–6.2)
SAMPLE: ABNORMAL
SAMPLE: NORMAL
SARS-COV-2 RDRP RESP QL NAA+PROBE: NEGATIVE
SODIUM BLD-SCNC: 138 MMOL/L (ref 136–145)
SODIUM SERPL-SCNC: 138 MMOL/L (ref 136–145)
SP GR UR STRIP: 1.02 (ref 1–1.03)
TOXICOLOGY INFORMATION: NORMAL
TROPONIN I SERPL DL<=0.01 NG/ML-MCNC: <0.006 NG/ML (ref 0–0.03)
URN SPEC COLLECT METH UR: NORMAL
UROBILINOGEN UR STRIP-ACNC: NEGATIVE EU/DL
WBC # BLD AUTO: 4.51 K/UL (ref 3.9–12.7)

## 2023-09-14 PROCEDURE — 81003 URINALYSIS AUTO W/O SCOPE: CPT | Performed by: EMERGENCY MEDICINE

## 2023-09-14 PROCEDURE — 94761 N-INVAS EAR/PLS OXIMETRY MLT: CPT

## 2023-09-14 PROCEDURE — 96366 THER/PROPH/DIAG IV INF ADDON: CPT

## 2023-09-14 PROCEDURE — 27000221 HC OXYGEN, UP TO 24 HOURS

## 2023-09-14 PROCEDURE — 25000003 PHARM REV CODE 250

## 2023-09-14 PROCEDURE — 99291 CRITICAL CARE FIRST HOUR: CPT

## 2023-09-14 PROCEDURE — 99499 NO LOS: ICD-10-PCS | Mod: ,,,

## 2023-09-14 PROCEDURE — 96361 HYDRATE IV INFUSION ADD-ON: CPT

## 2023-09-14 PROCEDURE — 84145 PROCALCITONIN (PCT): CPT | Performed by: EMERGENCY MEDICINE

## 2023-09-14 PROCEDURE — 93010 ELECTROCARDIOGRAM REPORT: CPT | Mod: ,,, | Performed by: INTERNAL MEDICINE

## 2023-09-14 PROCEDURE — 25000242 PHARM REV CODE 250 ALT 637 W/ HCPCS

## 2023-09-14 PROCEDURE — 93010 EKG 12-LEAD: ICD-10-PCS | Mod: ,,, | Performed by: INTERNAL MEDICINE

## 2023-09-14 PROCEDURE — 99291 CRITICAL CARE FIRST HOUR: CPT | Mod: ,,,

## 2023-09-14 PROCEDURE — 94660 CPAP INITIATION&MGMT: CPT

## 2023-09-14 PROCEDURE — 87635 SARS-COV-2 COVID-19 AMP PRB: CPT | Performed by: EMERGENCY MEDICINE

## 2023-09-14 PROCEDURE — 25000003 PHARM REV CODE 250: Performed by: EMERGENCY MEDICINE

## 2023-09-14 PROCEDURE — 93005 ELECTROCARDIOGRAM TRACING: CPT

## 2023-09-14 PROCEDURE — 27000207 HC ISOLATION

## 2023-09-14 PROCEDURE — 99499 UNLISTED E&M SERVICE: CPT | Mod: ,,,

## 2023-09-14 PROCEDURE — 80307 DRUG TEST PRSMV CHEM ANLYZR: CPT | Performed by: EMERGENCY MEDICINE

## 2023-09-14 PROCEDURE — 80053 COMPREHEN METABOLIC PANEL: CPT | Performed by: EMERGENCY MEDICINE

## 2023-09-14 PROCEDURE — 94640 AIRWAY INHALATION TREATMENT: CPT

## 2023-09-14 PROCEDURE — 96365 THER/PROPH/DIAG IV INF INIT: CPT

## 2023-09-14 PROCEDURE — 36600 WITHDRAWAL OF ARTERIAL BLOOD: CPT

## 2023-09-14 PROCEDURE — 83880 ASSAY OF NATRIURETIC PEPTIDE: CPT | Performed by: EMERGENCY MEDICINE

## 2023-09-14 PROCEDURE — 36415 COLL VENOUS BLD VENIPUNCTURE: CPT

## 2023-09-14 PROCEDURE — 84484 ASSAY OF TROPONIN QUANT: CPT | Performed by: EMERGENCY MEDICINE

## 2023-09-14 PROCEDURE — 83735 ASSAY OF MAGNESIUM: CPT | Performed by: EMERGENCY MEDICINE

## 2023-09-14 PROCEDURE — 63600175 PHARM REV CODE 636 W HCPCS: Performed by: EMERGENCY MEDICINE

## 2023-09-14 PROCEDURE — 87040 BLOOD CULTURE FOR BACTERIA: CPT | Performed by: EMERGENCY MEDICINE

## 2023-09-14 PROCEDURE — 99291 PR CRITICAL CARE, E/M 30-74 MINUTES: ICD-10-PCS | Mod: ,,,

## 2023-09-14 PROCEDURE — 63600175 PHARM REV CODE 636 W HCPCS

## 2023-09-14 PROCEDURE — 83690 ASSAY OF LIPASE: CPT | Performed by: EMERGENCY MEDICINE

## 2023-09-14 PROCEDURE — 86361 T CELL ABSOLUTE COUNT: CPT

## 2023-09-14 PROCEDURE — 85025 COMPLETE CBC W/AUTO DIFF WBC: CPT | Performed by: EMERGENCY MEDICINE

## 2023-09-14 PROCEDURE — 20000000 HC ICU ROOM

## 2023-09-14 PROCEDURE — 27000190 HC CPAP FULL FACE MASK W/VALVE

## 2023-09-14 PROCEDURE — 99900035 HC TECH TIME PER 15 MIN (STAT)

## 2023-09-14 PROCEDURE — 83615 LACTATE (LD) (LDH) ENZYME: CPT

## 2023-09-14 RX ORDER — PRAZOSIN HYDROCHLORIDE 1 MG/1
1 CAPSULE ORAL NIGHTLY
COMMUNITY
Start: 2023-08-15

## 2023-09-14 RX ORDER — MAG HYDROX/ALUMINUM HYD/SIMETH 200-200-20
30 SUSPENSION, ORAL (FINAL DOSE FORM) ORAL 4 TIMES DAILY PRN
Status: DISCONTINUED | OUTPATIENT
Start: 2023-09-14 | End: 2023-09-18 | Stop reason: HOSPADM

## 2023-09-14 RX ORDER — OSELTAMIVIR PHOSPHATE 75 MG/1
75 CAPSULE ORAL 2 TIMES DAILY
Status: DISCONTINUED | OUTPATIENT
Start: 2023-09-15 | End: 2023-09-18 | Stop reason: HOSPADM

## 2023-09-14 RX ORDER — SULFAMETHOXAZOLE AND TRIMETHOPRIM 800; 160 MG/1; MG/1
1 TABLET ORAL DAILY
COMMUNITY
Start: 2023-08-15

## 2023-09-14 RX ORDER — IBUPROFEN 200 MG
24 TABLET ORAL
Status: DISCONTINUED | OUTPATIENT
Start: 2023-09-14 | End: 2023-09-18 | Stop reason: HOSPADM

## 2023-09-14 RX ORDER — ACETAMINOPHEN 500 MG
1000 TABLET ORAL
Status: COMPLETED | OUTPATIENT
Start: 2023-09-14 | End: 2023-09-14

## 2023-09-14 RX ORDER — DEXTROMETHORPHAN POLISTIREX 30 MG/5ML
30 SUSPENSION ORAL EVERY 12 HOURS
Status: DISCONTINUED | OUTPATIENT
Start: 2023-09-14 | End: 2023-09-18 | Stop reason: HOSPADM

## 2023-09-14 RX ORDER — PRAZOSIN HYDROCHLORIDE 1 MG/1
1 CAPSULE ORAL NIGHTLY
Status: DISCONTINUED | OUTPATIENT
Start: 2023-09-14 | End: 2023-09-18 | Stop reason: HOSPADM

## 2023-09-14 RX ORDER — ACETAMINOPHEN 500 MG
1000 TABLET ORAL EVERY 8 HOURS PRN
Status: DISCONTINUED | OUTPATIENT
Start: 2023-09-15 | End: 2023-09-16

## 2023-09-14 RX ORDER — IPRATROPIUM BROMIDE AND ALBUTEROL SULFATE 2.5; .5 MG/3ML; MG/3ML
3 SOLUTION RESPIRATORY (INHALATION)
Status: DISCONTINUED | OUTPATIENT
Start: 2023-09-14 | End: 2023-09-15

## 2023-09-14 RX ORDER — TALC
6 POWDER (GRAM) TOPICAL NIGHTLY PRN
Status: DISCONTINUED | OUTPATIENT
Start: 2023-09-14 | End: 2023-09-18 | Stop reason: HOSPADM

## 2023-09-14 RX ORDER — SIMETHICONE 80 MG
1 TABLET,CHEWABLE ORAL 4 TIMES DAILY PRN
Status: DISCONTINUED | OUTPATIENT
Start: 2023-09-14 | End: 2023-09-18 | Stop reason: HOSPADM

## 2023-09-14 RX ORDER — MUPIROCIN 20 MG/G
OINTMENT TOPICAL 2 TIMES DAILY
Status: CANCELLED | OUTPATIENT
Start: 2023-09-14 | End: 2023-09-19

## 2023-09-14 RX ORDER — SODIUM CHLORIDE 0.9 % (FLUSH) 0.9 %
10 SYRINGE (ML) INJECTION EVERY 12 HOURS PRN
Status: DISCONTINUED | OUTPATIENT
Start: 2023-09-14 | End: 2023-09-18 | Stop reason: HOSPADM

## 2023-09-14 RX ORDER — ONDANSETRON 2 MG/ML
4 INJECTION INTRAMUSCULAR; INTRAVENOUS EVERY 8 HOURS PRN
Status: DISCONTINUED | OUTPATIENT
Start: 2023-09-15 | End: 2023-09-18 | Stop reason: HOSPADM

## 2023-09-14 RX ORDER — IBUPROFEN 200 MG
16 TABLET ORAL
Status: DISCONTINUED | OUTPATIENT
Start: 2023-09-14 | End: 2023-09-18 | Stop reason: HOSPADM

## 2023-09-14 RX ORDER — PREGABALIN 25 MG/1
25 CAPSULE ORAL 2 TIMES DAILY
Status: DISCONTINUED | OUTPATIENT
Start: 2023-09-15 | End: 2023-09-18 | Stop reason: HOSPADM

## 2023-09-14 RX ORDER — ONDANSETRON 2 MG/ML
4 INJECTION INTRAMUSCULAR; INTRAVENOUS
Status: COMPLETED | OUTPATIENT
Start: 2023-09-14 | End: 2023-09-14

## 2023-09-14 RX ORDER — INSULIN ASPART 100 [IU]/ML
0-5 INJECTION, SOLUTION INTRAVENOUS; SUBCUTANEOUS
Status: DISCONTINUED | OUTPATIENT
Start: 2023-09-14 | End: 2023-09-18 | Stop reason: HOSPADM

## 2023-09-14 RX ORDER — SULFAMETHOXAZOLE AND TRIMETHOPRIM 800; 160 MG/1; MG/1
2 TABLET ORAL EVERY 8 HOURS
Status: DISCONTINUED | OUTPATIENT
Start: 2023-09-14 | End: 2023-09-18

## 2023-09-14 RX ORDER — SODIUM CHLORIDE 9 MG/ML
INJECTION, SOLUTION INTRAVENOUS CONTINUOUS
Status: DISCONTINUED | OUTPATIENT
Start: 2023-09-14 | End: 2023-09-16

## 2023-09-14 RX ORDER — OSELTAMIVIR PHOSPHATE 75 MG/1
75 CAPSULE ORAL
Status: COMPLETED | OUTPATIENT
Start: 2023-09-14 | End: 2023-09-14

## 2023-09-14 RX ORDER — PREDNISONE 20 MG/1
40 TABLET ORAL 2 TIMES DAILY
Status: DISCONTINUED | OUTPATIENT
Start: 2023-09-14 | End: 2023-09-14

## 2023-09-14 RX ORDER — GLUCAGON 1 MG
1 KIT INJECTION
Status: DISCONTINUED | OUTPATIENT
Start: 2023-09-14 | End: 2023-09-18 | Stop reason: HOSPADM

## 2023-09-14 RX ORDER — PROCHLORPERAZINE EDISYLATE 5 MG/ML
5 INJECTION INTRAMUSCULAR; INTRAVENOUS EVERY 6 HOURS PRN
Status: DISCONTINUED | OUTPATIENT
Start: 2023-09-14 | End: 2023-09-18 | Stop reason: HOSPADM

## 2023-09-14 RX ADMIN — PIPERACILLIN AND TAZOBACTAM 4.5 G: 4; .5 INJECTION, POWDER, LYOPHILIZED, FOR SOLUTION INTRAVENOUS; PARENTERAL at 03:09

## 2023-09-14 RX ADMIN — PROCHLORPERAZINE EDISYLATE 5 MG: 5 INJECTION INTRAMUSCULAR; INTRAVENOUS at 09:09

## 2023-09-14 RX ADMIN — IPRATROPIUM BROMIDE AND ALBUTEROL SULFATE 3 ML: 2.5; .5 SOLUTION RESPIRATORY (INHALATION) at 07:09

## 2023-09-14 RX ADMIN — ONDANSETRON 4 MG: 2 INJECTION INTRAMUSCULAR; INTRAVENOUS at 06:09

## 2023-09-14 RX ADMIN — SULFAMETHOXAZOLE AND TRIMETHOPRIM 2 TABLET: 800; 160 TABLET ORAL at 09:09

## 2023-09-14 RX ADMIN — SODIUM CHLORIDE: 9 INJECTION, SOLUTION INTRAVENOUS at 09:09

## 2023-09-14 RX ADMIN — Medication 30 MG: at 09:09

## 2023-09-14 RX ADMIN — SODIUM CHLORIDE 2313 ML: 9 INJECTION, SOLUTION INTRAVENOUS at 03:09

## 2023-09-14 RX ADMIN — ACETAMINOPHEN 1000 MG: 500 TABLET ORAL at 03:09

## 2023-09-14 RX ADMIN — VANCOMYCIN HYDROCHLORIDE 2000 MG: 10 INJECTION, POWDER, LYOPHILIZED, FOR SOLUTION INTRAVENOUS at 03:09

## 2023-09-14 RX ADMIN — OSELTAMIVIR PHOSPHATE 75 MG: 75 CAPSULE ORAL at 04:09
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
## 2023-09-14 NOTE — ED PROVIDER NOTES
Encounter Date: 9/14/2023    SCRIBE #1 NOTE: I, Dinorah Ahmet, am scribing for, and in the presence of,  Oneil Wong MD. I have scribed the following portions of the note - Other sections scribed: HPI, ROS.       History     Chief Complaint   Patient presents with    Shortness of Breath     Sent by clinic for evaluation of PNA. HIV +, reports chills, body aches, congestion, sob, and cough. 91% RA, 97% 3L.      Patient is a 58 y.o. male presenting to the ED after being referred by a clinic due to SOB, hypoxia, chills, and body aches. He was febrile and hypoxic on arrival. Patient is coughing and complains of abdominal pain. Pt endorses some diarrhea, vomiting, and painful urination. He denies regular breathing issues. This is the extent of the patient's complaints at this time.        The history is provided by the patient and medical records. The history is limited by the condition of the patient.     Review of patient's allergies indicates:   Allergen Reactions    Tramadol Diarrhea     Past Medical History:   Diagnosis Date    Acid reflux     Addiction to drug     Anxiety     Depression     Diabetes mellitus, type 2     History of psychiatric hospitalization     HIV infection     Hx of psychiatric care     Tori     Psychiatric exam requested by authority     Psychiatric problem     Substance abuse     Suicide attempt     Therapy      Past Surgical History:   Procedure Laterality Date    LEG SURGERY      ORIF MANDIBULAR FRACTURE Left 9/9/2022    Procedure: ORIF, FRACTURE, MANDIBLE Reviewed by ROHITH 09/06/22 1201;  Surgeon: Trenton Drew DDS, MD;  Location: [facility] MA[facility];  Service: Oral Surgery;  Laterality: Left;     Family History   Problem Relation Age of Onset    Colon cancer Mother     No Known Problems Father      Social History     Tobacco Use    Smoking status: Every Day     Current packs/day: 1.00     Average packs/day: 1 pack/day for 19.7 years (19.7 ttl pk-yrs)     Types: Cigarettes     Start date:  1/1/2004    Smokeless tobacco: Never    Tobacco comments:     Patient declined nicotine patches   Substance Use Topics    Alcohol use: Not Currently    Drug use: Not Currently     Comment: currently in a [employer] program, not using that anymore     Review of Systems  Constitutional-Positive for fever, chills, and body aches.  HEENT-no congestion  Eyes-no redness  Respiratory-Positive for shortness of breath and coughing.  Cardio-no chest pain  GI-Positive for abdominal pain. Positive for vomiting and diarrhea.  Endocrine-no cold intolerance  -Positive for painful urination.  MSK-no myalgias  Skin-no rashes  Allergy-no environmental allergy  Neurologic-, no headache  Hematology-no swollen nodes  Behavioral-no confusion    Physical Exam     Initial Vitals [09/14/23 1436]   BP Pulse Resp Temp SpO2   127/70 105 20 (!) 102.9 °F (39.4 °C) 97 %      MAP       --         Physical Exam  Constitutional: ill appearing somnolent 57 yo man in moderate distress  Eyes: Conjunctivae normal.  ENT       Head: Normocephalic, atraumatic.       Nose: No congestion.       Mouth/Throat: Mucous membranes are moist.  Hematological/Lymphatic/Immunilogical: No cervical lymphadenopathy.  Cardiovascular: rapid rate, regular rhythm. Normal and symmetric distal pulses.  Respiratory: increased respiratory effort, crackles in the bases inferiorly  Gastrointestinal: Soft, diffusely tender, no round voluntary guarding diffusely prominent in the periumbilical area  Musculoskeletal: Normal range of motion in all extremities. No obvious deformities or swelling.  Neurologic: Alert, oriented. Normal speech and language. No gross focal neurologic deficits are appreciated.  Skin: Skin is warm, dry. No rash noted.  Psychiatric: Mood and affect are normal.     ED Course   Critical Care    Date/Time: 9/14/2023 3:06 PM    Performed by: Oneil Wong MD  Authorized by: Oneil Wong MD  Direct patient critical care time: 40 minutes  Additional  history critical care time: 5 minutes  Ordering / reviewing critical care time: 5 minutes  Documentation critical care time: 5 minutes  Consulting other physicians critical care time: 8 minutes  Total critical care time (exclusive of procedural time) : 63 minutes  Critical care time was exclusive of separately billable procedures and treating other patients and teaching time.  Critical care was necessary to treat or prevent imminent or life-threatening deterioration of the following conditions: respiratory failure and sepsis.  Critical care was time spent personally by me on the following activities: blood draw for specimens, development of treatment plan with patient or surrogate, discussions with primary provider, interpretation of cardiac output measurements, evaluation of patient's response to treatment, examination of patient, obtaining history from patient or surrogate, ordering and performing treatments and interventions, ordering and review of laboratory studies, ordering and review of radiographic studies, pulse oximetry, re-evaluation of patient's condition, review of old charts and ventilator management.        Labs Reviewed   CBC W/ AUTO DIFFERENTIAL - Abnormal; Notable for the following components:       Result Value    Hemoglobin 12.3 (*)     Hematocrit 39.5 (*)     MCV 74 (*)     MCH 23.0 (*)     MCHC 31.1 (*)     RDW 16.2 (*)     Immature Granulocytes 0.7 (*)     Lymph # 0.5 (*)     Gran % 80.7 (*)     Lymph % 10.0 (*)     All other components within normal limits   COMPREHENSIVE METABOLIC PANEL - Abnormal; Notable for the following components:    Alkaline Phosphatase 50 (*)     eGFR 58 (*)     All other components within normal limits   POCT INFLUENZA A/B MOLECULAR - Abnormal; Notable for the following components:    POC Molecular Influenza A Ag Positive (*)     All other components within normal limits   ISTAT PROCEDURE - Abnormal; Notable for the following components:    POC PO2 64 (*)     POC  HCO3 23.6 (*)     POC SATURATED O2 91 (*)     POC Potassium 3.4 (*)     All other components within normal limits   URINALYSIS, REFLEX TO URINE CULTURE    Narrative:     Specimen Source->Urine   MAGNESIUM   B-TYPE NATRIURETIC PEPTIDE   LIPASE   TROPONIN I   PROCALCITONIN   DRUG SCREEN PANEL, URINE EMERGENCY   DRUG SCREEN PANEL, URINE EMERGENCY    Narrative:     Specimen Source->Urine   PNEUMOCYSTIS JIROVEII BY PCR   SARS-COV-2 RDRP GENE   ISTAT LACTATE        ECG Results              EKG 12-lead (Preliminary result)  Result time 09/14/23 15:14:20      Wet Read by Oneil Wong MD (09/14/23 15:14:20, [facility], Emergency Medicine)    My EKG interpretation, sinus tachycardia, 102 beats per minute, normal axis, poor R-wave progression, when compared to previous EKG 07/07/2023 sinus tachycardia has replaced sinus rhythm                                  Imaging Results              CT Chest Without Contrast (Final result)  Result time 09/14/23 18:36:33      Final result by Oliva Jordan MD (09/14/23 18:36:33)                   Impression:      Bronchiectasis.  Mild emphysematous changes.  No focal consolidation.      Electronically signed by: Oliva Jordan  Date:    09/14/2023  Time:    18:36               Narrative:    EXAMINATION:  CT CHEST WITHOUT CONTRAST    CLINICAL HISTORY:  Respiratory illness, nondiagnostic xray;    TECHNIQUE:  1.25 mm images was obtained from the lung apices through the lung bases.  No intravenous contrast was given.  Coronal and sagittal reformatted images were provided.    COMPARISON:  None.    FINDINGS:  There are bilateral bronchiectatic changes.  There are bilateral apical emphysematous blebs and scattered bilateral emphysematous blebs.  No focal consolidation is detected.  Mild bibasilar atelectatic changes are present.    There is no evidence of mediastinal, hilar, or axillary adenopathy.  There are prominent bilateral and 2 minutes changes    There is no  pleural or pericardial effusion.    The heart size is .    In the visualized upper abdomen, .                                       X-Ray Chest AP Portable (Final result)  Result time 09/14/23 16:21:59      Final result by Allen Andrade MD (09/14/23 16:21:59)                   Impression:      1. Interstitial findings could reflect edema or other nonspecific pneumonitis noting overall accentuated by shallow inspiratory effort.  No large focal consolidation.      Electronically signed by: Allen Andrade MD  Date:    09/14/2023  Time:    16:21               Narrative:    EXAMINATION:  XR CHEST AP PORTABLE    CLINICAL HISTORY:  Sepsis;    TECHNIQUE:  Single frontal view of the chest was performed.    COMPARISON:  08/31/2022    FINDINGS:  The cardiomediastinal silhouette is not enlarged.  There is no pleural effusion.  The trachea is midline.  The lungs are symmetrically expanded bilaterally with coarse interstitial attenuation, accentuated by shallow inspiratory effort..  No large focal consolidation seen.  There is no pneumothorax.  The osseous structures are remarkable for degenerative change..                                       Medications   sodium chloride 0.9% flush 10 mL (has no administration in time range)   glucagon (human recombinant) injection 1 mg (has no administration in time range)   acetaminophen tablet 1,000 mg (has no administration in time range)   ondansetron injection 4 mg (has no administration in time range)   prochlorperazine injection Soln 5 mg (5 mg Intravenous Given 9/14/23 2120)   insulin aspart U-100 pen 0-5 Units (has no administration in time range)   glucose chewable tablet 16 g (has no administration in time range)   glucose chewable tablet 24 g (has no administration in time range)   simethicone chewable tablet 80 mg (has no administration in time range)   aluminum-magnesium hydroxide-simethicone 200-200-20 mg/5 mL suspension 30 mL (has no administration in time range)    melatonin tablet 6 mg (has no administration in time range)   dextrose 10% bolus 125 mL 125 mL (has no administration in time range)   dextrose 10% bolus 250 mL 250 mL (has no administration in time range)   sulfamethoxazole-trimethoprim 800-160mg per tablet 2 tablet (2 tablets Oral Given 9/15/23 0684)   oseltamivir capsule 75 mg (has no administration in time range)   0.9%  NaCl infusion ( Intravenous New Bag 9/15/23 0656)   dextromethorphan 30 mg/5 mL liquid 30 mg (30 mg Oral Given 9/14/23 2147)   ozctzboiq-eqmachkv-dsvypft ala -25 mg (25 kg or greater) 1 tablet (has no administration in time range)   prazosin capsule 1 mg (1 mg Oral Given 9/15/23 0010)   pregabalin capsule 25 mg (has no administration in time range)   albuterol-ipratropium 2.5 mg-0.5 mg/3 mL nebulizer solution 3 mL (3 mLs Nebulization Given 9/15/23 9335)   mupirocin 2 % ointment (has no administration in time range)   sodium chloride 0.9% bolus 2,313 mL 2,313 mL (0 mLs Intravenous Stopped 9/14/23 1824)   vancomycin 2 g in dextrose 5 % 500 mL IVPB (0 mg Intravenous Stopped 9/14/23 1753)   acetaminophen tablet 1,000 mg (1,000 mg Oral Given 9/14/23 1549)   oseltamivir capsule 75 mg (75 mg Oral Given 9/14/23 1644)   ondansetron injection 4 mg (4 mg Intravenous Given 9/14/23 1842)     Medical Decision Making  Ddx- pneumonia, covid, influenza, polysubstance use, cardiac failure, myocardial infarction, pneumothorax    Ill 59 yo man with fever, hypoxia, AMS.  Initiated sepsis protocol and order set. Obtained flu and covid, initiated broad spectrum IV ABX and IVF.  Flu +, covid negative.  Labs relatively stable but marked hypoxia and tachypnea.  Concern for developing ARDS with HIV/AIDS and flu.  Placed on oxygen.  Developed increased WOB post CT. Reassessed. Placed on Bipap.  Hypoxic on ABG.  Discussed with Dr. Cerda. Have a concern about worsening respiratory status. Tamiflu administered. Tylenol administered.  Plan for ICU admission.  Ongoing monitoring and reasessment.     Problems Addressed:  HIV infection, unspecified symptom status: chronic illness or injury with exacerbation, progression, or side effects of treatment that poses a threat to life or bodily functions  Hypoxia: complicated acute illness or injury with systemic symptoms that poses a threat to life or bodily functions  Influenza A: complicated acute illness or injury with systemic symptoms  Sepsis: complicated acute illness or injury with systemic symptoms that poses a threat to life or bodily functions  SOB (shortness of breath): complicated acute illness or injury    Amount and/or Complexity of Data Reviewed  Independent Historian: caregiver     Details: [facility] primary noted ill appearing with no fever and on rm air.   External Data Reviewed: labs, radiology, ECG and notes.     Details: History of substance use and poor compliance with HV treatment.   Labs: ordered.  Radiology: ordered and independent interpretation performed.     Details: CXR demonstrates increased marking in the perihilar region, no pneumothorax  ECG/medicine tests: ordered and independent interpretation performed.    Risk  OTC drugs.  Prescription drug management.  Parenteral controlled substances.  Drug therapy requiring intensive monitoring for toxicity.  Decision regarding hospitalization.  Diagnosis or treatment significantly limited by social determinants of health.  Risk Details: Social determinants of health related to substance use and housing instability.             Scribe Attestation:   Scribe #1: I performed the above scribed service and the documentation accurately describes the services I performed. I attest to the accuracy of the note.              Physician Attestation for Scribe: I, boy dockery, reviewed documentation as scribed in my presence, which is both accurate and complete.            Clinical Impression:   Final diagnoses:  [R06.02] SOB (shortness of breath)  [A41.9]  Sepsis  [J10.1] Influenza A  [R09.02] Hypoxia (Primary)  [B20] HIV infection, unspecified symptom status        ED Disposition Condition    Admit Stable                Oneil Wong MD  09/15/23 0907

## 2023-09-14 NOTE — ED NOTES
Late entry secondary to direct patient care. MD Wong called to bedside for increased WOB. Fluids stopped at 2000 mL. Lung sounds unchanged. Pt oxygen increased to 4L with improvement. Level of care to be reassessed.

## 2023-09-14 NOTE — ED TRIAGE NOTES
Pt sent from [facility] for concern for PNA due to cough and SOB. HIV+ on bactrim prophylaxis. C/o belly pain and SOB. Febrile and hypoxic on room air at triage. Pt now on 3L.

## 2023-09-14 NOTE — ED NOTES
Pt ringing call bell, dyspneic and pulled off oxygen, desatted to 87% on RA. Pt also vomited on the floor. MD aware pt increased WOB.

## 2023-09-14 NOTE — Clinical Note
Diagnosis: Influenza A [648514]   Admitting Provider:: TYE BOOTHE [[id number]]   Future Attending Provider: TYE BOOTHE [[id number]]   Reason for IP Medical Treatment  (Clinical interventions that can only be accomplished in the IP setting? ) :: influenza with hypoxia   I certify that Inpatient services for greater than or equal to 2 midnights are medically necessary:: Yes   Plans for Post-Acute care--if anticipated (pick the single best option):: C. Discharge home with home health services

## 2023-09-15 DIAGNOSIS — F17.200 NEEDS SMOKING CESSATION EDUCATION: ICD-10-CM

## 2023-09-15 PROBLEM — E11.9 TYPE 2 DIABETES MELLITUS, WITHOUT LONG-TERM CURRENT USE OF INSULIN: Status: ACTIVE | Noted: 2021-06-14

## 2023-09-15 PROBLEM — G57.93 NEUROPATHIC PAIN OF BOTH LEGS: Status: ACTIVE | Noted: 2023-09-14

## 2023-09-15 PROBLEM — F33.3 SEVERE EPISODE OF RECURRENT MAJOR DEPRESSIVE DISORDER, WITH PSYCHOTIC FEATURES: Chronic | Status: RESOLVED | Noted: 2021-06-14 | Resolved: 2023-09-15

## 2023-09-15 LAB
ANION GAP SERPL CALC-SCNC: 9 MMOL/L (ref 8–16)
BUN SERPL-MCNC: 12 MG/DL (ref 6–20)
CALCIUM SERPL-MCNC: 7.9 MG/DL (ref 8.7–10.5)
CHLORIDE SERPL-SCNC: 106 MMOL/L (ref 95–110)
CO2 SERPL-SCNC: 23 MMOL/L (ref 23–29)
CREAT SERPL-MCNC: 1 MG/DL (ref 0.5–1.4)
ERYTHROCYTE [DISTWIDTH] IN BLOOD BY AUTOMATED COUNT: 15.9 % (ref 11.5–14.5)
EST. GFR  (NO RACE VARIABLE): >60 ML/MIN/1.73 M^2
GLUCOSE SERPL-MCNC: 106 MG/DL (ref 70–110)
HCT VFR BLD AUTO: 34.2 % (ref 40–54)
HGB BLD-MCNC: 10.6 G/DL (ref 14–18)
LACTATE SERPL-SCNC: 0.8 MMOL/L (ref 0.5–2.2)
MCH RBC QN AUTO: 22.8 PG (ref 27–31)
MCHC RBC AUTO-ENTMCNC: 31 G/DL (ref 32–36)
MCV RBC AUTO: 74 FL (ref 82–98)
PLATELET # BLD AUTO: 176 K/UL (ref 150–450)
PMV BLD AUTO: 9.6 FL (ref 9.2–12.9)
POCT GLUCOSE: 115 MG/DL (ref 70–110)
POCT GLUCOSE: 117 MG/DL (ref 70–110)
POCT GLUCOSE: 127 MG/DL (ref 70–110)
POTASSIUM SERPL-SCNC: 4.2 MMOL/L (ref 3.5–5.1)
RBC # BLD AUTO: 4.64 M/UL (ref 4.6–6.2)
SODIUM SERPL-SCNC: 138 MMOL/L (ref 136–145)
WBC # BLD AUTO: 3.55 K/UL (ref 3.9–12.7)

## 2023-09-15 PROCEDURE — 36415 COLL VENOUS BLD VENIPUNCTURE: CPT | Performed by: INTERNAL MEDICINE

## 2023-09-15 PROCEDURE — 94761 N-INVAS EAR/PLS OXIMETRY MLT: CPT

## 2023-09-15 PROCEDURE — 27000221 HC OXYGEN, UP TO 24 HOURS

## 2023-09-15 PROCEDURE — 85027 COMPLETE CBC AUTOMATED: CPT

## 2023-09-15 PROCEDURE — 83605 ASSAY OF LACTIC ACID: CPT

## 2023-09-15 PROCEDURE — 27000207 HC ISOLATION

## 2023-09-15 PROCEDURE — 99233 PR SUBSEQUENT HOSPITAL CARE,LEVL III: ICD-10-PCS | Mod: ,,, | Performed by: INTERNAL MEDICINE

## 2023-09-15 PROCEDURE — 36415 COLL VENOUS BLD VENIPUNCTURE: CPT

## 2023-09-15 PROCEDURE — 20000000 HC ICU ROOM

## 2023-09-15 PROCEDURE — 80048 BASIC METABOLIC PNL TOTAL CA: CPT

## 2023-09-15 PROCEDURE — 87798 DETECT AGENT NOS DNA AMP: CPT

## 2023-09-15 PROCEDURE — 25000003 PHARM REV CODE 250

## 2023-09-15 PROCEDURE — 87449 NOS EACH ORGANISM AG IA: CPT | Performed by: INTERNAL MEDICINE

## 2023-09-15 PROCEDURE — 94640 AIRWAY INHALATION TREATMENT: CPT

## 2023-09-15 PROCEDURE — 25000242 PHARM REV CODE 250 ALT 637 W/ HCPCS: Performed by: PHYSICIAN ASSISTANT

## 2023-09-15 PROCEDURE — 25000003 PHARM REV CODE 250: Performed by: INTERNAL MEDICINE

## 2023-09-15 PROCEDURE — 99233 SBSQ HOSP IP/OBS HIGH 50: CPT | Mod: ,,, | Performed by: INTERNAL MEDICINE

## 2023-09-15 PROCEDURE — 25000242 PHARM REV CODE 250 ALT 637 W/ HCPCS: Performed by: NURSE PRACTITIONER

## 2023-09-15 PROCEDURE — 99900035 HC TECH TIME PER 15 MIN (STAT)

## 2023-09-15 RX ORDER — IPRATROPIUM BROMIDE AND ALBUTEROL SULFATE 2.5; .5 MG/3ML; MG/3ML
3 SOLUTION RESPIRATORY (INHALATION)
Status: DISCONTINUED | OUTPATIENT
Start: 2023-09-15 | End: 2023-09-16

## 2023-09-15 RX ORDER — IPRATROPIUM BROMIDE AND ALBUTEROL SULFATE 2.5; .5 MG/3ML; MG/3ML
3 SOLUTION RESPIRATORY (INHALATION)
Status: DISCONTINUED | OUTPATIENT
Start: 2023-09-15 | End: 2023-09-15

## 2023-09-15 RX ORDER — MUPIROCIN 20 MG/G
OINTMENT TOPICAL 2 TIMES DAILY
Status: DISCONTINUED | OUTPATIENT
Start: 2023-09-15 | End: 2023-09-18 | Stop reason: HOSPADM

## 2023-09-15 RX ADMIN — SODIUM CHLORIDE: 9 INJECTION, SOLUTION INTRAVENOUS at 06:09

## 2023-09-15 RX ADMIN — SULFAMETHOXAZOLE AND TRIMETHOPRIM 2 TABLET: 800; 160 TABLET ORAL at 06:09

## 2023-09-15 RX ADMIN — MUPIROCIN: 20 OINTMENT TOPICAL at 09:09

## 2023-09-15 RX ADMIN — PREGABALIN 25 MG: 25 CAPSULE ORAL at 09:09

## 2023-09-15 RX ADMIN — OSELTAMIVIR PHOSPHATE 75 MG: 75 CAPSULE ORAL at 09:09

## 2023-09-15 RX ADMIN — ACETAMINOPHEN 1000 MG: 500 TABLET ORAL at 09:09

## 2023-09-15 RX ADMIN — SULFAMETHOXAZOLE AND TRIMETHOPRIM 2 TABLET: 800; 160 TABLET ORAL at 02:09

## 2023-09-15 RX ADMIN — SULFAMETHOXAZOLE AND TRIMETHOPRIM 2 TABLET: 800; 160 TABLET ORAL at 09:09

## 2023-09-15 RX ADMIN — IPRATROPIUM BROMIDE AND ALBUTEROL SULFATE 3 ML: .5; 2.5 SOLUTION RESPIRATORY (INHALATION) at 04:09

## 2023-09-15 RX ADMIN — SODIUM CHLORIDE: 9 INJECTION, SOLUTION INTRAVENOUS at 04:09

## 2023-09-15 RX ADMIN — Medication 30 MG: at 09:09

## 2023-09-15 RX ADMIN — BICTEGRAVIR SODIUM, EMTRICITABINE, AND TENOFOVIR ALAFENAMIDE FUMARATE 1 TABLET: 50; 200; 25 TABLET ORAL at 09:09

## 2023-09-15 RX ADMIN — IPRATROPIUM BROMIDE AND ALBUTEROL SULFATE 3 ML: 2.5; .5 SOLUTION RESPIRATORY (INHALATION) at 08:09

## 2023-09-15 RX ADMIN — IPRATROPIUM BROMIDE AND ALBUTEROL SULFATE 3 ML: 2.5; .5 SOLUTION RESPIRATORY (INHALATION) at 04:09

## 2023-09-15 RX ADMIN — ACETAMINOPHEN 1000 MG: 500 TABLET ORAL at 07:09

## 2023-09-15 RX ADMIN — PRAZOSIN HYDROCHLORIDE 1 MG: 1 CAPSULE ORAL at 12:09

## 2023-09-15 RX ADMIN — IPRATROPIUM BROMIDE AND ALBUTEROL SULFATE 3 ML: 2.5; .5 SOLUTION RESPIRATORY (INHALATION) at 07:09

## 2023-09-15 RX ADMIN — IPRATROPIUM BROMIDE AND ALBUTEROL SULFATE 3 ML: 2.5; .5 SOLUTION RESPIRATORY (INHALATION) at 11:09

## 2023-09-15 NOTE — SUBJECTIVE & OBJECTIVE
Past Medical History:   Diagnosis Date    Acid reflux     Addiction to drug     Anxiety     Depression     Diabetes mellitus, type 2     History of psychiatric hospitalization     HIV infection     Hx of psychiatric care     Tori     Psychiatric exam requested by authority     Psychiatric problem     Substance abuse     Suicide attempt     Therapy        Past Surgical History:   Procedure Laterality Date    LEG SURGERY      ORIF MANDIBULAR FRACTURE Left 2022    Procedure: ORIF, FRACTURE, MANDIBLE Reviewed by ROHITH 22 1201;  Surgeon: Trenton Drew DDS, MD;  Location: [facility] MAIN OR;  Service: Oral Surgery;  Laterality: Left;       Review of patient's allergies indicates:   Allergen Reactions    Tramadol Diarrhea       Current Facility-Administered Medications on File Prior to Encounter   Medication    albuterol-ipratropium 2.5 mg-0.5 mg/3 mL nebulizer solution 3 mL    sodium chloride 0.9% flush 3 mL     Current Outpatient Medications on File Prior to Encounter   Medication Sig    oovdtnnzq-ztlpgrmn-gwujfrf ala (BIKTARVY) -25 mg (25 kg or greater) Take 1 tablet by mouth once daily.    prazosin (MINIPRESS) 1 MG Cap Take 1 mg by mouth every evening.    sulfamethoxazole-trimethoprim 800-160mg (BACTRIM DS) 800-160 mg Tab Take 1 tablet by mouth once daily.    [DISCONTINUED] hydrOXYzine HCL (ATARAX) 25 MG tablet Take 1 tablet (25 mg total) by mouth every morning.    diclofenac sodium (VOLTAREN) 1 % Gel Apply 2 g topically 4 (four) times daily.    gabapentin (NEURONTIN) 300 MG capsule Take 2 capsules (600 mg total) by mouth 3 (three) times daily.    ibuprofen (ADVIL,MOTRIN) 600 MG tablet Take 1 tablet (600 mg total) by mouth every 6 (six) hours as needed for Pain.    metFORMIN (GLUCOPHAGE) 500 MG tablet Take 1 tablet (500 mg total) by mouth 2 (two) times daily with meals.    VIVITROL 380 mg SSRR kit SMARTSI Each IM Once a Month    [DISCONTINUED] FLUoxetine 20 MG capsule Take 1 capsule (20 mg total) by mouth  once daily.    [DISCONTINUED] omeprazole (PRILOSEC) 20 MG capsule Take 20 mg by mouth once daily.    [DISCONTINUED] OXcarbazepine (TRILEPTAL) 150 MG Tab Take 1 tablet (150 mg total) by mouth 2 (two) times daily.    [DISCONTINUED] pantoprazole (PROTONIX) 40 MG tablet Take 1 tablet (40 mg total) by mouth before breakfast.    [DISCONTINUED] senna-docusate 8.6-50 mg (PERICOLACE) 8.6-50 mg per tablet Take 1 tablet by mouth 2 (two) times daily.    [DISCONTINUED] tamsulosin (FLOMAX) 0.4 mg Cap Take 1 capsule by mouth once daily.    [DISCONTINUED] traZODone (DESYREL) 50 MG tablet Take 1 tablet (50 mg total) by mouth nightly as needed for Insomnia.     Family History       Problem Relation (Age of Onset)    Colon cancer Mother    No Known Problems Father          Tobacco Use    Smoking status: Every Day     Current packs/day: 1.00     Average packs/day: 1 pack/day for 19.7 years (19.7 ttl pk-yrs)     Types: Cigarettes     Start date: 1/1/2004    Smokeless tobacco: Never    Tobacco comments:     Patient declined nicotine patches   Substance and Sexual Activity    Alcohol use: Not Currently    Drug use: Not Currently     Comment: currently in a [employer] program, not using that anymore    Sexual activity: Not Currently     Review of Systems   Constitutional:  Positive for diaphoresis and fever.   HENT:  Negative for congestion and sore throat.    Respiratory:  Positive for cough and shortness of breath.    Cardiovascular:  Negative for chest pain and leg swelling.   Gastrointestinal:  Positive for abdominal distention, abdominal pain, diarrhea, nausea and vomiting.   Genitourinary:  Negative for difficulty urinating and dysuria.   Musculoskeletal:  Positive for gait problem (chronic).        Neuropathic pain in both legs   Skin:  Negative for rash and wound.   Neurological:  Positive for headaches. Negative for light-headedness.   Psychiatric/Behavioral:  Negative for agitation, behavioral problems, hallucinations and suicidal  "ideas.      Objective:     Vital Signs (Most Recent):  Temp: 99.7 °F (37.6 °C) (09/14/23 2000)  Pulse: 94 (09/14/23 2041)  Resp: (!) 22 (09/14/23 2041)  BP: (!) 156/81 (09/14/23 2000)  SpO2: 95 % (09/14/23 2041) Vital Signs (24h Range):  Temp:  [99.3 °F (37.4 °C)-102.9 °F (39.4 °C)] 99.7 °F (37.6 °C)  Pulse:  [] 94  Resp:  [20-41] 22  SpO2:  [91 %-98 %] 95 %  BP: (117-156)/(60-81) 156/81     Weight: 74.1 kg (163 lb 5.8 oz)  Body mass index is 22.16 kg/m².     Physical Exam  Vitals and nursing note reviewed.   Constitutional:       General: He is in acute distress.      Appearance: He is ill-appearing.   HENT:      Head: Normocephalic and atraumatic.      Nose: No congestion or rhinorrhea.   Eyes:      General: No scleral icterus.        Right eye: No discharge.         Left eye: No discharge.   Cardiovascular:      Rate and Rhythm: Regular rhythm.      Pulses: Normal pulses.   Pulmonary:      Breath sounds: No wheezing.      Comments: On 4L/min NC, SOB, labored breathing  Abdominal:      General: There is distension.      Tenderness: There is abdominal tenderness (generalized).   Musculoskeletal:      Right lower leg: No edema.      Left lower leg: No edema.   Skin:     General: Skin is warm and dry.   Neurological:      Mental Status: He is alert and oriented to person, place, and time. Mental status is at baseline.   Psychiatric:         Attention and Perception: Attention normal.         Mood and Affect: Mood normal.         Speech: Speech normal.         Behavior: Behavior normal.         Thought Content: Thought content is not paranoid. Thought content does not include homicidal ideation. Thought content does not include homicidal plan.         Cognition and Memory: Cognition normal.                Significant Labs: All pertinent labs within the past 24 hours have been reviewed.  A1C: No results for input(s): "HGBA1C" in the last 4320 hours.  ABGs:   Recent Labs   Lab 09/14/23  1742   PH 7.353   PCO2 42.5 " "  HCO3 23.6*   POCSATURATED 91*   BE -2   PO2 64*     Bilirubin:   Recent Labs   Lab 09/14/23  1523   BILITOT 0.2     Blood Culture: No results for input(s): "LABBLOO" in the last 48 hours.  BMP:   Recent Labs   Lab 09/14/23  1523   GLU 90      K 4.2      CO2 25   BUN 15   CREATININE 1.4   CALCIUM 9.4   MG 1.7     CBC:   Recent Labs   Lab 09/14/23  1523 09/14/23  1742   WBC 4.51  --    HGB 12.3*  --    HCT 39.5* 36     --      CMP:   Recent Labs   Lab 09/14/23  1523      K 4.2      CO2 25   GLU 90   BUN 15   CREATININE 1.4   CALCIUM 9.4   PROT 7.4   ALBUMIN 4.1   BILITOT 0.2   ALKPHOS 50*   AST 26   ALT 15   ANIONGAP 10     Lactic Acid: No results for input(s): "LACTATE" in the last 48 hours.  Lipase:   Recent Labs   Lab 09/14/23  1523   LIPASE 11     Magnesium:   Recent Labs   Lab 09/14/23  1523   MG 1.7     POCT Glucose:   Recent Labs   Lab 09/14/23  2121   POCTGLUCOSE 99     Urine Culture: No results for input(s): "LABURIN" in the last 48 hours.  Urine Studies:   Recent Labs   Lab 09/14/23  1608   COLORU Yellow   APPEARANCEUA Clear   PHUR 6.0   SPECGRAV 1.025   PROTEINUA Negative   GLUCUA Negative   KETONESU Negative   BILIRUBINUA Negative   OCCULTUA Negative   NITRITE Negative   UROBILINOGEN Negative   LEUKOCYTESUR Negative       Significant Imaging: I have reviewed and interpreted all pertinent imaging results/findings within the past 24 hours.  "
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
## 2023-09-15 NOTE — EICU
Brief Note     58 yr old male   Presented with GI symptoms and dyspnea   Initially on NIV   H/o CD4 count being 184 in the past ?   Possible emphysema       Acute respiratory failure   Influenza +ve   Possible HIV ? Needs testing   Emphysema / Bronchiectasis     CT chest seen   On oseltamivir   No bacterial infection on CT chest   On 4 litres oxygen now, not in need of NIV at the moment   HD stable

## 2023-09-15 NOTE — ASSESSMENT & PLAN NOTE
Chronic. BG 90 on arrival. Med rec done with patient.   Home Meds:  Metformin 500mg PO daily.   -Hold home meds  -Diabetic Diet  -Check A1C  -Moderate correction dose SSI.

## 2023-09-15 NOTE — ED NOTES
RN spoke with RT. Pt placed on BiPAP and neb tx administered.  RT concerned about pt compliance with BiPAP because he states that it's uncomfortable.     RN introduced to patient. Tolerating BiPAP well. States that he feels more comfortable now. Updated on plan for ICU transfer. Agreeable with plan. No apparent distress noted at this time. Call light within reach. Care ongoing.
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
## 2023-09-15 NOTE — ASSESSMENT & PLAN NOTE
- Also happened acutely today, patient reports abdominal pain, vomiting and diarrhea about 3-4 times. Patient denies fever, chills, dysuria  - Vitals on arrival with sepsis Tmax 102.9F (39.4C), , RR 20, /70, Pox 97% on 3L NC  - Lipase WNL, T Bili WNL  - Of note, med recs with patient showed that patient had been taking Sulfamethoxazole-Trimethoprim DS 1 tablet daily as prescribed by [facility].   - however patient needs to have TMP-SMX continued for concerns of PJP    Plan:  - Collect stool for C.Diff  - Continue prophylactic treatment for PJP  - Check PJP sputum
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
## 2023-09-15 NOTE — ASSESSMENT & PLAN NOTE
Patient denies SI or HI. Patient also denies use of any illict drugs currently.  UDS was negative.  -Continue with razosin 1mg PO qhs

## 2023-09-15 NOTE — PLAN OF CARE
Bipap applied, 10 over 5. Patient complained that it was too strong and appeared not to be tolerating mask. Turned down to 8 over 4, can't really go any lower. Patient accepting mask at this time.

## 2023-09-15 NOTE — CARE UPDATE
09/14/23 2041   Patient Assessment/Suction   Level of Consciousness (AVPU) alert   Respiratory Effort Normal;Unlabored   Expansion/Accessory Muscles/Retractions no use of accessory muscles;no retractions   Skin Integrity   $ Wound Care Tech Time 15 min   Area Observed Left;Right;Behind ear;Nares   Skin Appearance without discoloration   PRE-TX-O2   Device (Oxygen Therapy) nasal cannula   $ Is the patient on Low Flow Oxygen? Yes   Flow (L/min) 4   SpO2 95 %   Pulse Oximetry Type Continuous   $ Pulse Oximetry - Multiple Charge Pulse Oximetry - Multiple   Pulse 94   Resp (!) 22   Preset CPAP/BiPAP Settings   Mode Of Delivery Standby  (not needed at this time, no distress noted, will cont to monitor, nurse aware.)

## 2023-09-15 NOTE — ASSESSMENT & PLAN NOTE
Dangers of cigarette smoking were reviewed with patient in detail. Patient declines nicotine patch.

## 2023-09-15 NOTE — NURSING
Nurses Note -- 4 Eyes      9/14/2023   8:36 PM      Skin assessed during: Admit      [x] No Altered Skin Integrity Present    [x]Prevention Measures Documented      [] Yes- Altered Skin Integrity Present or Discovered   [] LDA Added if Not in Epic (Describe Wound)   [] New Altered Skin Integrity was Present on Admit and Documented in LDA   [] Wound Image Taken    Wound Care Consulted? No    Attending Nurse:  Emmie Bentley RN     Second RN/Staff Member:  Armani Zelaya RN
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
## 2023-09-15 NOTE — ASSESSMENT & PLAN NOTE
Chronic. BG 90 on arrival. Med rec done with patient.     - Hold home dose Metformin 500mg PO daily.   - BG before meals and bedtime, low dose correctional insulin aspart PRN

## 2023-09-15 NOTE — ASSESSMENT & PLAN NOTE
Patient denies SI or HI. Patient also denies use of any illict drugs currently. UDS was clear.     - Resume home dose prazosin 1mg PO qhs

## 2023-09-15 NOTE — ASSESSMENT & PLAN NOTE
Sepsis    Influenza A   COPD exacerbation   Possible PCP pneumonitis in the setting of unknown CD4 counts in HIV  Hx of HIV (on Biktarvy), anxiety, prior substance abuse (cocaine, heroin), 20 pack year nicotine dependence current smoker   - referred to ED by clinician at [facility] where he gets HIV care.   - Acutely SOB this morning with cough with blood in his sputum with nausea, vomiting and diarrhea.   - Denies fever, chills, chest pain, dysuria, pedal edema. Patient reports compliance to follow-up at HIV clinic, meds, but doesn't know CD 4 counts. Of note, med recs with patient showed that patient had been taking Sulfamethoxazole-Trimethoprim DS 1 tablet daily as prescribed by [facility].   - Febrile with Tmax 102.9F (39.4C), , RR 20, /70, Pox 97% on 3L NC. Flu A positive, covid negative. No leukocytosis but with left shift. Blood cx sent. Procal WNL. H/H 12.3/39.5. BNP and Trop I WNL. ABG with pH 7.35, pO2 64, bicarb 23.6. UDS negative for substances.    - CXR with interstitial findings could reflect edema or other nonspecific pneumonitis noting overall accentuated by shallow inspiratory effort. No large focal consolidation. CT Chest without contrast with bronchiectasis. Mild emphysematous changes. No focal consolidation.   - in ED: Patient was given sepsis dose fluids 2.3L, Vanc IV and piperacillin-tazobactam 4.5g IV, oseltamivir 75mg PO. Patient also given ondansetron 4mg IV and acetaminophen 1g PO in ED. - Patient continued to be tachypneic, vomited, and eventually placed on BiPAP and sent to ICU  - Appreciate Dr. Li's assistance and input in patient's care  - Patient was weaned down to 4L/min O2    Plan:  - Appreciate crit care assistance  - Continue oseltamivir 75mg PO BID  - Duonebs Q4 wake  - Oxygen as needed to keep Pox > 95%, BiPAP if needed  - Start sulfamethoxazole-trimethoprim DS PO BID for PCP prophylaxis, but hold prednisone PO  - Check Fungitell PCR (sputum), LDH, CD 4   -  Dextromethorphan BID  - Continue Biktarvy PO daily  - NaCl 0.9% 100ml/hr  - Follow blood cx 9/14/23  - Encourage smoking cessation, patient declined nicotine patch

## 2023-09-15 NOTE — SUBJECTIVE & OBJECTIVE
Interval History: Patient is awake and alert this morning.  Still with fevers and chill.  Has a dry cough.  SOB stable to slightly better.  No chest pain.  No N/V/D.      Review of Systems   Constitutional:  Positive for diaphoresis and fever.   HENT:  Negative for congestion and sore throat.    Respiratory:  Positive for cough (dry) and shortness of breath.    Cardiovascular:  Negative for chest pain and leg swelling.   Gastrointestinal:  Positive for nausea. Negative for abdominal distention, abdominal pain, diarrhea and vomiting.   Genitourinary:  Negative for difficulty urinating and dysuria.   Musculoskeletal:  Positive for gait problem (chronic).        Neuropathic pain in both legs   Skin:  Negative for rash and wound.   Neurological:  Negative for light-headedness and headaches.   Psychiatric/Behavioral:  Positive for dysphoric mood. Negative for agitation, behavioral problems, hallucinations and suicidal ideas.      Objective:     Vital Signs (Most Recent):  Temp: 99.8 °F (37.7 °C) (09/15/23 1430)  Pulse: 72 (09/15/23 1430)  Resp: (!) 31 (09/15/23 1430)  BP: 117/60 (09/15/23 1430)  SpO2: 98 % (09/15/23 1430) Vital Signs (24h Range):  Temp:  [99.3 °F (37.4 °C)-101.9 °F (38.8 °C)] 99.8 °F (37.7 °C)  Pulse:  [] 72  Resp:  [14-46] 31  SpO2:  [91 %-99 %] 98 %  BP: (113-156)/(60-82) 117/60     Weight: 74.1 kg (163 lb 5.8 oz)  Body mass index is 22.16 kg/m².    Intake/Output Summary (Last 24 hours) at 9/15/2023 1539  Last data filed at 9/15/2023 1101  Gross per 24 hour   Intake 2998.78 ml   Output 1025 ml   Net 1973.78 ml         Physical Exam  Vitals and nursing note reviewed.   Constitutional:       General: He is in acute distress (mild).      Appearance: He is ill-appearing (mild). He is not toxic-appearing.   HENT:      Head: Normocephalic and atraumatic.      Right Ear: External ear normal.      Left Ear: External ear normal.      Nose: Nose normal. No congestion or rhinorrhea.      Mouth/Throat:       Mouth: Mucous membranes are moist.      Pharynx: Oropharynx is clear.      Comments: No oral thrush  Eyes:      General: No scleral icterus.        Right eye: No discharge.         Left eye: No discharge.      Pupils: Pupils are equal, round, and reactive to light.   Cardiovascular:      Rate and Rhythm: Regular rhythm. Tachycardia present.      Pulses: Normal pulses.      Heart sounds: No murmur heard.     No gallop.   Pulmonary:      Breath sounds: No wheezing, rhonchi or rales.      Comments: Mild tachypnea.  Pox stable on 3L  Abdominal:      General: Abdomen is flat. Bowel sounds are normal. There is no distension.      Palpations: Abdomen is soft.      Tenderness: There is no abdominal tenderness.   Musculoskeletal:         General: Normal range of motion.      Cervical back: Normal range of motion and neck supple. No rigidity.      Right lower leg: No edema.      Left lower leg: No edema.   Lymphadenopathy:      Cervical: Cervical adenopathy present.   Skin:     General: Skin is warm and dry.      Findings: No rash.   Neurological:      General: No focal deficit present.      Mental Status: He is alert and oriented to person, place, and time. Mental status is at baseline.      Cranial Nerves: No cranial nerve deficit.   Psychiatric:         Attention and Perception: Attention normal.         Mood and Affect: Mood normal.         Speech: Speech normal.         Behavior: Behavior normal.         Thought Content: Thought content is not paranoid. Thought content does not include homicidal ideation. Thought content does not include homicidal plan.         Cognition and Memory: Cognition normal.             Significant Labs: All pertinent labs within the past 24 hours have been reviewed.    Significant Imaging: I have reviewed all pertinent imaging results/findings within the past 24 hours.

## 2023-09-15 NOTE — PLAN OF CARE
Initial Discharge Planning Assessment: 9/15/23    Patient admitted on: 9/14/23    Chart reviewed, Care plan discussed with treatment team, attending Dr. Lorenzo    PCP updated in Epic: yes    Pharmacy updated in Epic: yes    DME at home: cane    Current Dispo: home    Transportation: will need a ride upon discharge    Anticipated DC needs from CM perspecrive: none at this time    Case management to follow for plans and arrangements        09/15/23 1351   Discharge Assessment   Assessment Type Discharge Planning Assessment   Confirmed/corrected address, phone number and insurance   (Patient says he now lives in [facility] but is unsure of address. Sister contacted but says she does not know his address and that he moves often.)   Source of Information patient   Communicated ZORAIDA with patient/caregiver Date not available/Unable to determine   People in Home alone   Do you expect to return to your current living situation? Yes   Do you have help at home or someone to help you manage your care at home? No   Prior to hospitilization cognitive status: Alert/Oriented   Current cognitive status: Alert/Oriented   Equipment Currently Used at Home cane, straight   Readmission within 30 days? No   Do you take prescription medications? Yes   Do you have prescription coverage? Yes   Coverage Medicaid/[employer]   Do you have any problems affording any of your prescribed medications? No   Who is going to help you get home at discharge? Patient will need a ride home   How do you get to doctors appointments? other (see comments)  (patient says he uses van transportation service)   DME Needed Upon Discharge  none   Discharge Plan discussed with: Patient   Transition of Care Barriers None   Discharge Plan A Home   Discharge Plan B Home   Financial Resource Strain   How hard is it for you to pay for the very basics like food, housing, medical care, and heating? Somewhat   Housing Stability   In the last 12 months, was  there a time when you were not able to pay the mortgage or rent on time? N   In the last 12 months, was there a time when you did not have a steady place to sleep or slept in a shelter (including now)? N   Transportation Needs   In the past 12 months, has lack of transportation kept you from medical appointments or from getting medications? no   In the past 12 months, has lack of transportation kept you from meetings, work, or from getting things needed for daily living? No   Food Insecurity   Within the past 12 months, you worried that your food would run out before you got the money to buy more. Never true   Within the past 12 months, the food you bought just didn't last and you didn't have money to get more. Never true   Stress   Do you feel stress - tense, restless, nervous, or anxious, or unable to sleep at night because your mind is troubled all the time - these days? Only a littl   Social Connections   In a typical week, how many times do you talk on the phone with family, friends, or neighbors? Once a week   How often do you get together with friends or relatives? Once   How often do you attend Episcopalian or Advent services? 1 to 4   Do you belong to any clubs or organizations such as Episcopalian groups, unions, fraternal or athletic groups, or school groups? No   How often do you attend meetings of the clubs or organizations you belong to? Never     Shinto - Intensive Care (Yasmin)  Initial Discharge Assessment       Primary Care Provider: [facility]    Admission Diagnosis: SOB (shortness of breath) [R06.02]  Influenza A [J10.1]  Hypoxia [R09.02]  Sepsis [A41.9]  HIV infection, unspecified symptom status [B20]    Admission Date: 9/14/2023  Expected Discharge Date:     Transition of Care Barriers: (P) None    Payor: MEDICAID / Plan: [employer] / Product Type: Managed Medicaid /     Extended Emergency Contact Information  Primary Emergency Contact: Howie Mosqueda  Phone: [phone]  Relation: Sister  Preferred language: English   needed? No    Discharge Plan A: (P) Home  Discharge Plan B: (P) Home      [id number] [place] [facility] [address] AT [facility] OF [facility]  [address]  [place] [address]  Phone: [phone] Fax: [phone]    [id number] - [place], [place] - [address] AT SEC OF [facility] &  [address]  [facility] [address]  Phone: [phone] Fax: [phone]    [place], [place] - [address] [facility]  [address] [facility] [address]  Phone: [phone] Fax: [phone]    [id number] [place], [place] - [address] AT [facility] [address]  [address]  [place] [address]  Phone: [phone] Fax: [phone]    [facility] [address] [place] [address] - [address]  [address]  [facility] [address]  Phone: [phone] Fax: [phone]      Initial Assessment (most recent)       Adult Discharge Assessment - 09/15/23 1351          Discharge Assessment    Assessment Type Discharge Planning Assessment (P)      Confirmed/corrected address, phone number and insurance -- (P)    Patient says he now lives in [place], [place]. but is unsure of address. Sister contacted but says she does not know his address and that he moves often.    Source of Information patient (P)      Communicated ZORAIDA with patient/caregiver Date not available/Unable to determine (P)      People in Home alone (P)      Do you expect to return to your current living situation? Yes (P)      Do you have help at home or someone to help you manage your care at home? No (P)      Prior to hospitilization cognitive status: Alert/Oriented (P)      Current cognitive status: Alert/Oriented (P)      Equipment Currently Used at Home cane, straight (P)      Readmission within 30 days? No (P)      Do you take  prescription medications? Yes (P)      Do you have prescription coverage? Yes (P)      Coverage Medicaid/[employer] (P)      Do you have any problems affording any of your prescribed medications? No (P)      Who is going to help you get home at discharge? Patient will need a ride home (P)      How do you get to doctors appointments? other (see comments) (P)    patient says he uses van transportation service    DME Needed Upon Discharge  none (P)      Discharge Plan discussed with: Patient (P)      Transition of Care Barriers None (P)      Discharge Plan A Home (P)      Discharge Plan B Home (P)         Financial Resource Strain    How hard is it for you to pay for the very basics like food, housing, medical care, and heating? Somewhat hard (P)         Housing Stability    In the last 12 months, was there a time when you were not able to pay the mortgage or rent on time? No (P)      In the last 12 months, was there a time when you did not have a steady place to sleep or slept in a shelter (including now)? No (P)         Transportation Needs    In the past 12 months, has lack of transportation kept you from medical appointments or from getting medications? No (P)      In the past 12 months, has lack of transportation kept you from meetings, work, or from getting things needed for daily living? No (P)         Food Insecurity    Within the past 12 months, you worried that your food would run out before you got the money to buy more. Never true (P)      Within the past 12 months, the food you bought just didn't last and you didn't have money to get more. Never true (P)         Stress    Do you feel stress - tense, restless, nervous, or anxious, or unable to sleep at night because your mind is troubled all the time - these days? Only a little (P)         Social Connections    In a typical week, how many times do you talk on the phone with family, friends, or neighbors? Once a week (P)      How often do you  get together with friends or relatives? Once a week (P)      How often do you attend Bahai or Scientologist services? 1 to 4 times per year (P)      Do you belong to any clubs or organizations such as Bahai groups, unions, fraternal or athletic groups, or school groups? No (P)      How often do you attend meetings of the clubs or organizations you belong to? Never (P)

## 2023-09-15 NOTE — ASSESSMENT & PLAN NOTE
Patient reports abdominal pain with nausea/vomiting and diarrhea about 3-4 times.  Resolved since admission.  -Monitor for recurrence and send for stool studies

## 2023-09-15 NOTE — ASSESSMENT & PLAN NOTE
Patient with a history of COPD and active Smoker, AIDS with CD4 surya <200 who presented with fever/chills, acute cough with dry cough and SOB and found to have Influenza A.  Temp in ED was 102.9.  Pox was 91 on RA and placed on O2NC and eventually placed on BiPAP and admitted to ICU.  CXR on admission with interstitial findings could reflect edema or other nonspecific pneumonitis noting overall accentuated by shallow inspiratory effort.  No large focal consolidation.  CT Chest wo done and showed bronchiectasis.  Mild emphysematous changes.  No focal consolidation.  Labs in ED with WBC 4.51 without left shift, Trop negative, BNP negative, Lactic Acid negative x 2, , Procal negative, Influenza A+.  Patient given a dose of IV Vanc and Zosyn in the ED and changed to Bactrim DS 2 pills q8 to cover PJP.  Patient started on Tamilfu.  I suspect this is all just the Influenza A.  He was weaned off BiPAP and O2 decreased to 3L.      Plan:  Continue with Tamiflu  Continue with Bactrim DS 2 pp q8 to cover PJP - no steroids given Influenza  Follow up with Sputum PJP and Fungitell labs - if negative, change Bactrim BS to prophylaxis  Continue with DuoNebs  Continue with O2NC to keep Pox >90  Follow up with Blood cultures

## 2023-09-15 NOTE — ASSESSMENT & PLAN NOTE
Patient follows with [facility] and a poor historian.  States he was seen last on the day of admission, as he was referred to the ED from clinic.  Unclear last CD4, but surya is <200.  Unclear if he is taking Bactrim DS for PJP prophylaxis.  Claims to be on Biktarvy and adherent with treatment, but this is unlikely.  -Repeat CD4  -Continue with Biktarvy for now

## 2023-09-16 PROBLEM — F17.200 TOBACCO DEPENDENCY: Status: ACTIVE | Noted: 2023-09-16

## 2023-09-16 LAB
ANION GAP SERPL CALC-SCNC: 7 MMOL/L (ref 8–16)
BUN SERPL-MCNC: 12 MG/DL (ref 6–20)
CALCIUM SERPL-MCNC: 7.8 MG/DL (ref 8.7–10.5)
CHLORIDE SERPL-SCNC: 106 MMOL/L (ref 95–110)
CO2 SERPL-SCNC: 23 MMOL/L (ref 23–29)
CREAT SERPL-MCNC: 1.1 MG/DL (ref 0.5–1.4)
ERYTHROCYTE [DISTWIDTH] IN BLOOD BY AUTOMATED COUNT: 15.9 % (ref 11.5–14.5)
EST. GFR  (NO RACE VARIABLE): >60 ML/MIN/1.73 M^2
GLUCOSE SERPL-MCNC: 97 MG/DL (ref 70–110)
HCT VFR BLD AUTO: 32.9 % (ref 40–54)
HGB BLD-MCNC: 10.3 G/DL (ref 14–18)
MCH RBC QN AUTO: 22.9 PG (ref 27–31)
MCHC RBC AUTO-ENTMCNC: 31.3 G/DL (ref 32–36)
MCV RBC AUTO: 73 FL (ref 82–98)
PLATELET # BLD AUTO: 158 K/UL (ref 150–450)
PMV BLD AUTO: 9.7 FL (ref 9.2–12.9)
POCT GLUCOSE: 111 MG/DL (ref 70–110)
POCT GLUCOSE: 150 MG/DL (ref 70–110)
POCT GLUCOSE: 79 MG/DL (ref 70–110)
POCT GLUCOSE: 89 MG/DL (ref 70–110)
POTASSIUM SERPL-SCNC: 4 MMOL/L (ref 3.5–5.1)
RBC # BLD AUTO: 4.49 M/UL (ref 4.6–6.2)
SODIUM SERPL-SCNC: 136 MMOL/L (ref 136–145)
WBC # BLD AUTO: 2.57 K/UL (ref 3.9–12.7)

## 2023-09-16 PROCEDURE — 94640 AIRWAY INHALATION TREATMENT: CPT

## 2023-09-16 PROCEDURE — 25000003 PHARM REV CODE 250

## 2023-09-16 PROCEDURE — 80048 BASIC METABOLIC PNL TOTAL CA: CPT

## 2023-09-16 PROCEDURE — 36415 COLL VENOUS BLD VENIPUNCTURE: CPT

## 2023-09-16 PROCEDURE — 25000242 PHARM REV CODE 250 ALT 637 W/ HCPCS: Performed by: NURSE PRACTITIONER

## 2023-09-16 PROCEDURE — 99900035 HC TECH TIME PER 15 MIN (STAT)

## 2023-09-16 PROCEDURE — 99233 PR SUBSEQUENT HOSPITAL CARE,LEVL III: ICD-10-PCS | Mod: ,,, | Performed by: INTERNAL MEDICINE

## 2023-09-16 PROCEDURE — 25000003 PHARM REV CODE 250: Performed by: INTERNAL MEDICINE

## 2023-09-16 PROCEDURE — 85027 COMPLETE CBC AUTOMATED: CPT

## 2023-09-16 PROCEDURE — 99233 SBSQ HOSP IP/OBS HIGH 50: CPT | Mod: ,,, | Performed by: INTERNAL MEDICINE

## 2023-09-16 PROCEDURE — 94761 N-INVAS EAR/PLS OXIMETRY MLT: CPT

## 2023-09-16 PROCEDURE — 27000221 HC OXYGEN, UP TO 24 HOURS

## 2023-09-16 PROCEDURE — 27000207 HC ISOLATION

## 2023-09-16 PROCEDURE — 11000001 HC ACUTE MED/SURG PRIVATE ROOM

## 2023-09-16 RX ORDER — GUAIFENESIN 600 MG/1
600 TABLET, EXTENDED RELEASE ORAL 2 TIMES DAILY
Status: DISCONTINUED | OUTPATIENT
Start: 2023-09-16 | End: 2023-09-18 | Stop reason: HOSPADM

## 2023-09-16 RX ORDER — IPRATROPIUM BROMIDE AND ALBUTEROL SULFATE 2.5; .5 MG/3ML; MG/3ML
3 SOLUTION RESPIRATORY (INHALATION) EVERY 6 HOURS PRN
Status: DISCONTINUED | OUTPATIENT
Start: 2023-09-16 | End: 2023-09-18 | Stop reason: HOSPADM

## 2023-09-16 RX ORDER — IBUPROFEN 400 MG/1
800 TABLET ORAL ONCE AS NEEDED
Status: DISCONTINUED | OUTPATIENT
Start: 2023-09-16 | End: 2023-09-18 | Stop reason: HOSPADM

## 2023-09-16 RX ORDER — ACETAMINOPHEN 325 MG/1
650 TABLET ORAL EVERY 6 HOURS PRN
Status: DISCONTINUED | OUTPATIENT
Start: 2023-09-16 | End: 2023-09-18 | Stop reason: HOSPADM

## 2023-09-16 RX ADMIN — ACETAMINOPHEN 650 MG: 325 TABLET, FILM COATED ORAL at 04:09

## 2023-09-16 RX ADMIN — ACETAMINOPHEN 650 MG: 325 TABLET, FILM COATED ORAL at 09:09

## 2023-09-16 RX ADMIN — SULFAMETHOXAZOLE AND TRIMETHOPRIM 2 TABLET: 800; 160 TABLET ORAL at 03:09

## 2023-09-16 RX ADMIN — PREGABALIN 25 MG: 25 CAPSULE ORAL at 08:09

## 2023-09-16 RX ADMIN — PRAZOSIN HYDROCHLORIDE 1 MG: 1 CAPSULE ORAL at 10:09

## 2023-09-16 RX ADMIN — SODIUM CHLORIDE: 9 INJECTION, SOLUTION INTRAVENOUS at 02:09

## 2023-09-16 RX ADMIN — GUAIFENESIN 600 MG: 600 TABLET, EXTENDED RELEASE ORAL at 10:09

## 2023-09-16 RX ADMIN — MUPIROCIN: 20 OINTMENT TOPICAL at 10:09

## 2023-09-16 RX ADMIN — Medication 30 MG: at 08:09

## 2023-09-16 RX ADMIN — OSELTAMIVIR PHOSPHATE 75 MG: 75 CAPSULE ORAL at 10:09

## 2023-09-16 RX ADMIN — Medication 30 MG: at 10:09

## 2023-09-16 RX ADMIN — OSELTAMIVIR PHOSPHATE 75 MG: 75 CAPSULE ORAL at 08:09

## 2023-09-16 RX ADMIN — BICTEGRAVIR SODIUM, EMTRICITABINE, AND TENOFOVIR ALAFENAMIDE FUMARATE 1 TABLET: 50; 200; 25 TABLET ORAL at 08:09

## 2023-09-16 RX ADMIN — SULFAMETHOXAZOLE AND TRIMETHOPRIM 2 TABLET: 800; 160 TABLET ORAL at 06:09

## 2023-09-16 RX ADMIN — ACETAMINOPHEN 1000 MG: 500 TABLET ORAL at 03:09

## 2023-09-16 RX ADMIN — IPRATROPIUM BROMIDE AND ALBUTEROL SULFATE 3 ML: 2.5; .5 SOLUTION RESPIRATORY (INHALATION) at 07:09

## 2023-09-16 RX ADMIN — PREGABALIN 25 MG: 25 CAPSULE ORAL at 11:09

## 2023-09-16 RX ADMIN — MUPIROCIN: 20 OINTMENT TOPICAL at 08:09

## 2023-09-16 RX ADMIN — IPRATROPIUM BROMIDE AND ALBUTEROL SULFATE 3 ML: 2.5; .5 SOLUTION RESPIRATORY (INHALATION) at 12:09

## 2023-09-16 RX ADMIN — SULFAMETHOXAZOLE AND TRIMETHOPRIM 2 TABLET: 800; 160 TABLET ORAL at 10:09
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
## 2023-09-16 NOTE — NURSING
I gave report to nurse taking room 321. Patient in stable condition. Will transfer the patient in wheelchair with tele monitor along with the nurse escort.

## 2023-09-16 NOTE — SUBJECTIVE & OBJECTIVE
Interval History: Patient is awake and alert this morning.  Still with fevers overnight, but no rigors and feeling better. Cough is resolving.  Pox now stable off Oxygen.  No chest pain.  No N/V/D.      Review of Systems   Constitutional:  Positive for fever. Negative for diaphoresis.   HENT:  Negative for congestion and sore throat.    Respiratory:  Positive for shortness of breath (improving). Negative for cough.    Cardiovascular:  Negative for chest pain and leg swelling.   Gastrointestinal:  Negative for abdominal distention, abdominal pain, diarrhea, nausea and vomiting.   Genitourinary:  Negative for difficulty urinating and dysuria.   Musculoskeletal:  Positive for gait problem (chronic).        Neuropathic pain in both legs   Skin:  Negative for rash and wound.   Neurological:  Negative for light-headedness and headaches.   Psychiatric/Behavioral:  Positive for dysphoric mood (improving). Negative for agitation, behavioral problems, hallucinations and suicidal ideas.      Objective:     Vital Signs (Most Recent):  Temp: 98.2 °F (36.8 °C) (09/16/23 0705)  Pulse: 80 (09/16/23 1000)  Resp: 18 (09/16/23 1000)  BP: 123/61 (09/16/23 1000)  SpO2: (!) 93 % (09/16/23 1000) Vital Signs (24h Range):  Temp:  [98.2 °F (36.8 °C)-102.3 °F (39.1 °C)] 98.2 °F (36.8 °C)  Pulse:  [] 80  Resp:  [15-48] 18  SpO2:  [90 %-99 %] 93 %  BP: ()/(50-81) 123/61     Weight: 74.1 kg (163 lb 5.8 oz)  Body mass index is 22.16 kg/m².    Intake/Output Summary (Last 24 hours) at 9/16/2023 1020  Last data filed at 9/16/2023 0900  Gross per 24 hour   Intake 3169.47 ml   Output 2150 ml   Net 1019.47 ml           Physical Exam  Vitals and nursing note reviewed.   Constitutional:       General: He is not in acute distress.     Appearance: He is ill-appearing (mild and improving). He is not toxic-appearing.   HENT:      Head: Normocephalic and atraumatic.      Right Ear: External ear normal.      Left Ear: External ear normal.       Nose: Nose normal. No congestion or rhinorrhea.      Mouth/Throat:      Mouth: Mucous membranes are moist.      Pharynx: Oropharynx is clear.      Comments: No oral thrush  Eyes:      General: No scleral icterus.        Right eye: No discharge.         Left eye: No discharge.      Pupils: Pupils are equal, round, and reactive to light.   Cardiovascular:      Rate and Rhythm: Normal rate and regular rhythm.      Pulses: Normal pulses.      Heart sounds: No murmur heard.     No gallop.   Pulmonary:      Breath sounds: No wheezing, rhonchi or rales.   Abdominal:      General: Abdomen is flat. Bowel sounds are normal. There is no distension.      Palpations: Abdomen is soft.      Tenderness: There is no abdominal tenderness.   Musculoskeletal:         General: Normal range of motion.      Cervical back: Normal range of motion and neck supple. No rigidity.      Right lower leg: No edema.      Left lower leg: No edema.   Lymphadenopathy:      Cervical: Cervical adenopathy present.   Skin:     General: Skin is warm and dry.      Findings: No rash.   Neurological:      General: No focal deficit present.      Mental Status: He is alert and oriented to person, place, and time. Mental status is at baseline.      Cranial Nerves: No cranial nerve deficit.   Psychiatric:         Attention and Perception: Attention normal.         Mood and Affect: Mood normal.         Speech: Speech normal.         Behavior: Behavior normal.         Thought Content: Thought content is not paranoid. Thought content does not include homicidal ideation. Thought content does not include homicidal plan.         Cognition and Memory: Cognition normal.             Significant Labs: All pertinent labs within the past 24 hours have been reviewed.    Significant Imaging: I have reviewed all pertinent imaging results/findings within the past 24 hours.

## 2023-09-16 NOTE — ASSESSMENT & PLAN NOTE
Patient with a history of COPD and active Smoker, AIDS with CD4 surya <200 who presented with fever/chills, acute cough with dry cough and SOB and found to have Influenza A.  Temp in ED was 102.9.  Pox was 91 on RA and placed on O2NC and eventually placed on BiPAP and admitted to ICU.  CXR on admission with interstitial findings could reflect edema or other nonspecific pneumonitis noting overall accentuated by shallow inspiratory effort.  No large focal consolidation.  CT Chest wo done and showed bronchiectasis.  Mild emphysematous changes.  No focal consolidation.  Labs in ED with WBC 4.51 without left shift, Trop negative, BNP negative, Lactic Acid negative x 2, , Procal negative, Influenza A+.  Patient given a dose of IV Vanc and Zosyn in the ED and changed to Bactrim DS 2 pills q8 to cover PJP.  Patient started on Tamilfu.  He was weaned off BiPAP and O2 and Pox stable on RA.  Still with fevers overnight, but feeling better.  I suspect this is all just the Influenza A.  Will transfer out of ICU,    Plan:  Continue with Tamiflu - day #3  Continue with Bactrim DS 2 pp q8 to cover PJP - no steroids given Influenza  Follow up with Sputum PJP and Fungitell labs - if negative, change Bactrim BS to prophylaxis  Continue with DuoNebs as needed  Continue with O2NC to keep Pox >90  Follow up with Blood cultures - NGTD  Continue with isolation

## 2023-09-16 NOTE — ASSESSMENT & PLAN NOTE
Patient follows with [facility] and a poor historian.  States he was seen last on the day of admission, as he was referred to the ED from clinic.  Unclear last CD4, but surya is <200.  Unclear if he is taking Bactrim DS for PJP prophylaxis.  Claims to be on Biktarvy and adherent with treatment, but this is unlikely.  -Repeat CD4 pending  -Continue with Biktarvy for now

## 2023-09-16 NOTE — PLAN OF CARE
Problem: Infection  Goal: Absence of Infection Signs and Symptoms  Outcome: Ongoing, Progressing   Patient is on droplet precautions for Flu.  Medications given for infection per MD orders. Tylenol given PRN for fevers. TMAX  this shift 102.1.

## 2023-09-16 NOTE — PROGRESS NOTES
Patient received on 2 LPM NC SpO2 98% with no reported distress. Q4 aerosol tx given tolerated well with NARN. Will continue to monitor and wean as tolerated.BIPAP on standby PRN.

## 2023-09-17 LAB
ANION GAP SERPL CALC-SCNC: 9 MMOL/L (ref 8–16)
BUN SERPL-MCNC: 12 MG/DL (ref 6–20)
CALCIUM SERPL-MCNC: 8.5 MG/DL (ref 8.7–10.5)
CHLORIDE SERPL-SCNC: 103 MMOL/L (ref 95–110)
CO2 SERPL-SCNC: 24 MMOL/L (ref 23–29)
CREAT SERPL-MCNC: 1.3 MG/DL (ref 0.5–1.4)
ERYTHROCYTE [DISTWIDTH] IN BLOOD BY AUTOMATED COUNT: 15.9 % (ref 11.5–14.5)
EST. GFR  (NO RACE VARIABLE): >60 ML/MIN/1.73 M^2
ESTIMATED AVG GLUCOSE: 131 MG/DL (ref 68–131)
GLUCOSE SERPL-MCNC: 105 MG/DL (ref 70–110)
HBA1C MFR BLD: 6.2 % (ref 4–5.6)
HCT VFR BLD AUTO: 34.9 % (ref 40–54)
HGB BLD-MCNC: 11 G/DL (ref 14–18)
MCH RBC QN AUTO: 22.8 PG (ref 27–31)
MCHC RBC AUTO-ENTMCNC: 31.5 G/DL (ref 32–36)
MCV RBC AUTO: 72 FL (ref 82–98)
PLATELET # BLD AUTO: 179 K/UL (ref 150–450)
PMV BLD AUTO: 10.3 FL (ref 9.2–12.9)
POCT GLUCOSE: 115 MG/DL (ref 70–110)
POCT GLUCOSE: 119 MG/DL (ref 70–110)
POCT GLUCOSE: 157 MG/DL (ref 70–110)
POCT GLUCOSE: 57 MG/DL (ref 70–110)
POCT GLUCOSE: 95 MG/DL (ref 70–110)
POTASSIUM SERPL-SCNC: 4.1 MMOL/L (ref 3.5–5.1)
RBC # BLD AUTO: 4.82 M/UL (ref 4.6–6.2)
SODIUM SERPL-SCNC: 136 MMOL/L (ref 136–145)
WBC # BLD AUTO: 2.87 K/UL (ref 3.9–12.7)

## 2023-09-17 PROCEDURE — 25000003 PHARM REV CODE 250: Performed by: INTERNAL MEDICINE

## 2023-09-17 PROCEDURE — 94761 N-INVAS EAR/PLS OXIMETRY MLT: CPT

## 2023-09-17 PROCEDURE — 83036 HEMOGLOBIN GLYCOSYLATED A1C: CPT | Performed by: INTERNAL MEDICINE

## 2023-09-17 PROCEDURE — 21400001 HC TELEMETRY ROOM

## 2023-09-17 PROCEDURE — 36415 COLL VENOUS BLD VENIPUNCTURE: CPT

## 2023-09-17 PROCEDURE — 85027 COMPLETE CBC AUTOMATED: CPT

## 2023-09-17 PROCEDURE — 99233 SBSQ HOSP IP/OBS HIGH 50: CPT | Mod: ,,, | Performed by: INTERNAL MEDICINE

## 2023-09-17 PROCEDURE — 27000207 HC ISOLATION

## 2023-09-17 PROCEDURE — 80048 BASIC METABOLIC PNL TOTAL CA: CPT

## 2023-09-17 PROCEDURE — 99233 PR SUBSEQUENT HOSPITAL CARE,LEVL III: ICD-10-PCS | Mod: ,,, | Performed by: INTERNAL MEDICINE

## 2023-09-17 PROCEDURE — 25000003 PHARM REV CODE 250

## 2023-09-17 RX ADMIN — ACETAMINOPHEN 650 MG: 325 TABLET, FILM COATED ORAL at 04:09

## 2023-09-17 RX ADMIN — Medication 6 MG: at 08:09

## 2023-09-17 RX ADMIN — Medication 30 MG: at 08:09

## 2023-09-17 RX ADMIN — MUPIROCIN: 20 OINTMENT TOPICAL at 10:09

## 2023-09-17 RX ADMIN — PREGABALIN 25 MG: 25 CAPSULE ORAL at 09:09

## 2023-09-17 RX ADMIN — GUAIFENESIN 600 MG: 600 TABLET, EXTENDED RELEASE ORAL at 08:09

## 2023-09-17 RX ADMIN — PREGABALIN 25 MG: 25 CAPSULE ORAL at 10:09

## 2023-09-17 RX ADMIN — OSELTAMIVIR PHOSPHATE 75 MG: 75 CAPSULE ORAL at 08:09

## 2023-09-17 RX ADMIN — SULFAMETHOXAZOLE AND TRIMETHOPRIM 2 TABLET: 800; 160 TABLET ORAL at 04:09

## 2023-09-17 RX ADMIN — ACETAMINOPHEN 650 MG: 325 TABLET, FILM COATED ORAL at 09:09

## 2023-09-17 RX ADMIN — SULFAMETHOXAZOLE AND TRIMETHOPRIM 2 TABLET: 800; 160 TABLET ORAL at 09:09

## 2023-09-17 RX ADMIN — SULFAMETHOXAZOLE AND TRIMETHOPRIM 2 TABLET: 800; 160 TABLET ORAL at 05:09

## 2023-09-17 RX ADMIN — ACETAMINOPHEN 650 MG: 325 TABLET, FILM COATED ORAL at 01:09

## 2023-09-17 RX ADMIN — MUPIROCIN: 20 OINTMENT TOPICAL at 08:09

## 2023-09-17 RX ADMIN — PRAZOSIN HYDROCHLORIDE 1 MG: 1 CAPSULE ORAL at 08:09

## 2023-09-17 RX ADMIN — BICTEGRAVIR SODIUM, EMTRICITABINE, AND TENOFOVIR ALAFENAMIDE FUMARATE 1 TABLET: 50; 200; 25 TABLET ORAL at 08:09

## 2023-09-17 NOTE — SUBJECTIVE & OBJECTIVE
Interval History: Patient is awake and alert this morning.  Still with fevers overnight, but no rigors and continues feeling better. Cough resolved.  Pox stable off Oxygen.  Some CHACON.  No chest pain.  No N/V/D.      Review of Systems   Constitutional:  Positive for fever. Negative for diaphoresis.   HENT:  Negative for congestion and sore throat.    Respiratory:  Positive for shortness of breath (mostly with exertion). Negative for cough.    Cardiovascular:  Negative for chest pain and leg swelling.   Gastrointestinal:  Negative for abdominal distention, abdominal pain, diarrhea, nausea and vomiting.   Genitourinary:  Negative for difficulty urinating and dysuria.   Musculoskeletal:  Positive for gait problem (chronic).        Neuropathic pain in both legs   Skin:  Negative for rash and wound.   Neurological:  Negative for light-headedness and headaches.   Psychiatric/Behavioral:  Negative for agitation, behavioral problems, dysphoric mood, hallucinations and suicidal ideas.      Objective:     Vital Signs (Most Recent):  Temp: 98.6 °F (37 °C) (09/17/23 0842)  Pulse: 91 (09/17/23 0842)  Resp: 18 (09/17/23 0842)  BP: 122/71 (09/17/23 0842)  SpO2: (!) 92 % (09/17/23 0842) Vital Signs (24h Range):  Temp:  [98 °F (36.7 °C)-102.1 °F (38.9 °C)] 98.6 °F (37 °C)  Pulse:  [] 91  Resp:  [16-23] 18  SpO2:  [92 %-96 %] 92 %  BP: (111-134)/(61-71) 122/71     Weight: 74.1 kg (163 lb 5.8 oz)  Body mass index is 22.16 kg/m².    Intake/Output Summary (Last 24 hours) at 9/17/2023 0957  Last data filed at 9/17/2023 0401  Gross per 24 hour   Intake 250 ml   Output 1120 ml   Net -870 ml           Physical Exam  Vitals and nursing note reviewed.   Constitutional:       General: He is not in acute distress.     Appearance: He is ill-appearing (mild and improving). He is not toxic-appearing.   HENT:      Head: Normocephalic and atraumatic.      Right Ear: External ear normal.      Left Ear: External ear normal.      Nose: Nose  normal. No congestion or rhinorrhea.      Mouth/Throat:      Mouth: Mucous membranes are moist.      Pharynx: Oropharynx is clear.      Comments: No oral thrush  Eyes:      General: No scleral icterus.        Right eye: No discharge.         Left eye: No discharge.      Pupils: Pupils are equal, round, and reactive to light.   Cardiovascular:      Rate and Rhythm: Normal rate and regular rhythm.      Pulses: Normal pulses.      Heart sounds: No murmur heard.     No gallop.   Pulmonary:      Breath sounds: No wheezing, rhonchi or rales.   Abdominal:      General: Abdomen is flat. Bowel sounds are normal. There is no distension.      Palpations: Abdomen is soft.      Tenderness: There is no abdominal tenderness.   Musculoskeletal:         General: Normal range of motion.      Cervical back: Normal range of motion and neck supple. No rigidity.      Right lower leg: No edema.      Left lower leg: No edema.   Lymphadenopathy:      Cervical: Cervical adenopathy present.   Skin:     General: Skin is warm and dry.      Findings: No rash.   Neurological:      General: No focal deficit present.      Mental Status: He is alert and oriented to person, place, and time. Mental status is at baseline.      Cranial Nerves: No cranial nerve deficit.   Psychiatric:         Attention and Perception: Attention normal.         Mood and Affect: Mood normal.         Speech: Speech normal.         Behavior: Behavior normal.         Thought Content: Thought content is not paranoid. Thought content does not include homicidal ideation. Thought content does not include homicidal plan.         Cognition and Memory: Cognition normal.          Significant Labs: All pertinent labs within the past 24 hours have been reviewed.    Significant Imaging: I have reviewed all pertinent imaging results/findings within the past 24 hours.

## 2023-09-17 NOTE — ASSESSMENT & PLAN NOTE
Glucose stable.  A1C 6.2 on admission.  Home Meds:  Metformin 500mg PO daily.   -Hold home meds  -Diabetic Diet  -Moderate correction dose SSI.
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
## 2023-09-17 NOTE — PLAN OF CARE
POC reviewed. No significant events this shift. Cardiac monitor maintained. Pt stable on RA. No complaints of pain. Pt voids and shifts in bed independently. Bed low and locked, side rails up x3, call light within reach.    Problem: Adult Inpatient Plan of Care  Goal: Plan of Care Review  Outcome: Ongoing, Progressing  Goal: Patient-Specific Goal (Individualized)  Outcome: Ongoing, Progressing  Goal: Absence of Hospital-Acquired Illness or Injury  Outcome: Ongoing, Progressing  Goal: Optimal Comfort and Wellbeing  Outcome: Ongoing, Progressing  Goal: Readiness for Transition of Care  Outcome: Ongoing, Progressing     Problem: Diabetes Comorbidity  Goal: Blood Glucose Level Within Targeted Range  Outcome: Ongoing, Progressing     Problem: Adjustment to Illness (Sepsis/Septic Shock)  Goal: Optimal Coping  Outcome: Ongoing, Progressing     Problem: Bleeding (Sepsis/Septic Shock)  Goal: Absence of Bleeding  Outcome: Ongoing, Progressing     Problem: Glycemic Control Impaired (Sepsis/Septic Shock)  Goal: Blood Glucose Level Within Desired Range  Outcome: Ongoing, Progressing     Problem: Infection Progression (Sepsis/Septic Shock)  Goal: Absence of Infection Signs and Symptoms  Outcome: Ongoing, Progressing     Problem: Nutrition Impaired (Sepsis/Septic Shock)  Goal: Optimal Nutrition Intake  Outcome: Ongoing, Progressing     Problem: Infection  Goal: Absence of Infection Signs and Symptoms  Outcome: Ongoing, Progressing

## 2023-09-17 NOTE — CARE UPDATE
09/16/23 1953   Patient Assessment/Suction   Level of Consciousness (AVPU) alert   Respiratory Effort Normal;Unlabored   Expansion/Accessory Muscles/Retractions expansion symmetric;no retractions   All Lung Fields Breath Sounds Anterior:;Lateral:;diminished;clear   Rhythm/Pattern, Respiratory no shortness of breath reported   PRE-TX-O2   Device (Oxygen Therapy) room air   SpO2 (!) 94 %   Pulse Oximetry Type Intermittent   $ Pulse Oximetry - Multiple Charge Pulse Oximetry - Multiple   Pulse 90   Resp 20   Aerosol Therapy   $ Aerosol Therapy Charges PRN treatment not required

## 2023-09-17 NOTE — ASSESSMENT & PLAN NOTE
Patient with a history of COPD and active Smoker, AIDS with CD4 surya <200 who presented with fever/chills, acute cough with dry cough and SOB and found to have Influenza A.  Temp in ED was 102.9.  Pox was 91 on RA and placed on O2NC and eventually placed on BiPAP and admitted to ICU.  CXR on admission with interstitial findings could reflect edema or other nonspecific pneumonitis noting overall accentuated by shallow inspiratory effort.  No large focal consolidation.  CT Chest wo done and showed bronchiectasis.  Mild emphysematous changes.  No focal consolidation.  Labs in ED with WBC 4.51 without left shift, Trop negative, BNP negative, Lactic Acid negative x 2, , Procal negative, Influenza A+.  Patient given a dose of IV Vanc and Zosyn in the ED and changed to Bactrim DS 2 pills q8 to cover PJP.  Patient started on Tamilfu.  He was weaned off BiPAP and O2 and Pox stable on RA.  Transferred out of ICU on 9/16/2023.  Still with fevers overnight, but feeling better.  I suspect this is all just the Influenza A.  Will repeat CXR.    Plan:  Continue with Tamiflu - day #4/5  Continue with Bactrim DS 2 pp q8 to cover PJP - no steroids given Influenza  Follow up with Sputum PJP and Fungitell labs - if negative, change Bactrim BS to prophylaxis  Continue with DuoNebs as needed  Continue with O2NC to keep Pox >90  Follow up with Blood cultures - NGTD  Continue with isolation

## 2023-09-17 NOTE — ASSESSMENT & PLAN NOTE
Patient follows with [facility] and a poor historian.  States he was seen last on the day of admission, as he was referred to the ED from clinic.  Unclear last CD4, but surya is <200.  Unclear if he is taking Bactrim DS for PJP prophylaxis.  Claims to be on Biktarvy and adherent with treatment, but this is unlikely.  -Repeat CD4 pending  -Continue with Biktarvy for now

## 2023-09-18 VITALS
SYSTOLIC BLOOD PRESSURE: 106 MMHG | TEMPERATURE: 100 F | RESPIRATION RATE: 16 BRPM | DIASTOLIC BLOOD PRESSURE: 73 MMHG | HEIGHT: 72 IN | WEIGHT: 163.38 LBS | OXYGEN SATURATION: 93 % | HEART RATE: 83 BPM | BODY MASS INDEX: 22.13 KG/M2

## 2023-09-18 LAB
1,3 BETA GLUCAN SER-MCNC: <31 PG/ML
ANION GAP SERPL CALC-SCNC: 7 MMOL/L (ref 8–16)
ANNOTATION COMMENT IMP: NORMAL
BASOPHILS # BLD AUTO: 0.01 K/UL (ref 0–0.2)
BASOPHILS NFR BLD: 0.5 % (ref 0–1.9)
BUN SERPL-MCNC: 13 MG/DL (ref 6–20)
CALCIUM SERPL-MCNC: 8.7 MG/DL (ref 8.7–10.5)
CD3+CD4+ CELLS # BLD: 49 CELLS/UL (ref 300–1400)
CD3+CD4+ CELLS NFR BLD: 13.4 % (ref 28–57)
CHLORIDE SERPL-SCNC: 103 MMOL/L (ref 95–110)
CO2 SERPL-SCNC: 25 MMOL/L (ref 23–29)
CREAT SERPL-MCNC: 1.2 MG/DL (ref 0.5–1.4)
DIFFERENTIAL METHOD: ABNORMAL
EOSINOPHIL # BLD AUTO: 0 K/UL (ref 0–0.5)
EOSINOPHIL NFR BLD: 0.5 % (ref 0–8)
ERYTHROCYTE [DISTWIDTH] IN BLOOD BY AUTOMATED COUNT: 16 % (ref 11.5–14.5)
EST. GFR  (NO RACE VARIABLE): >60 ML/MIN/1.73 M^2
FUNGITELL COMMENTS: NEGATIVE
GLUCOSE SERPL-MCNC: 95 MG/DL (ref 70–110)
HCT VFR BLD AUTO: 35.4 % (ref 40–54)
HGB BLD-MCNC: 11.4 G/DL (ref 14–18)
IMM GRANULOCYTES # BLD AUTO: 0.01 K/UL (ref 0–0.04)
IMM GRANULOCYTES NFR BLD AUTO: 0.5 % (ref 0–0.5)
LYMPHOCYTES # BLD AUTO: 1.1 K/UL (ref 1–4.8)
LYMPHOCYTES NFR BLD: 53.3 % (ref 18–48)
MAGNESIUM SERPL-MCNC: 1.9 MG/DL (ref 1.6–2.6)
MCH RBC QN AUTO: 23 PG (ref 27–31)
MCHC RBC AUTO-ENTMCNC: 32.2 G/DL (ref 32–36)
MCV RBC AUTO: 71 FL (ref 82–98)
MONOCYTES # BLD AUTO: 0.2 K/UL (ref 0.3–1)
MONOCYTES NFR BLD: 7.5 % (ref 4–15)
NEUTROPHILS # BLD AUTO: 0.8 K/UL (ref 1.8–7.7)
NEUTROPHILS NFR BLD: 37.7 % (ref 38–73)
NRBC BLD-RTO: 0 /100 WBC
P JIROVECII DNA L RESP QL NAA+NON-PROBE: NEGATIVE
PLATELET # BLD AUTO: 177 K/UL (ref 150–450)
PLATELET BLD QL SMEAR: ABNORMAL
PMV BLD AUTO: 9.8 FL (ref 9.2–12.9)
PNEUMOCYSTIS REPORT STATUS: NORMAL
POTASSIUM SERPL-SCNC: 4 MMOL/L (ref 3.5–5.1)
RBC # BLD AUTO: 4.96 M/UL (ref 4.6–6.2)
SODIUM SERPL-SCNC: 135 MMOL/L (ref 136–145)
SPECIMEN SOURCE: NORMAL
WBC # BLD AUTO: 2.12 K/UL (ref 3.9–12.7)

## 2023-09-18 PROCEDURE — 25000003 PHARM REV CODE 250

## 2023-09-18 PROCEDURE — 99239 PR HOSPITAL DISCHARGE DAY,>30 MIN: ICD-10-PCS | Mod: ,,, | Performed by: INTERNAL MEDICINE

## 2023-09-18 PROCEDURE — 99239 HOSP IP/OBS DSCHRG MGMT >30: CPT | Mod: ,,, | Performed by: INTERNAL MEDICINE

## 2023-09-18 PROCEDURE — 36415 COLL VENOUS BLD VENIPUNCTURE: CPT | Performed by: INTERNAL MEDICINE

## 2023-09-18 PROCEDURE — 99900035 HC TECH TIME PER 15 MIN (STAT)

## 2023-09-18 PROCEDURE — 83735 ASSAY OF MAGNESIUM: CPT | Performed by: INTERNAL MEDICINE

## 2023-09-18 PROCEDURE — 80048 BASIC METABOLIC PNL TOTAL CA: CPT | Performed by: INTERNAL MEDICINE

## 2023-09-18 PROCEDURE — 94761 N-INVAS EAR/PLS OXIMETRY MLT: CPT

## 2023-09-18 PROCEDURE — 85025 COMPLETE CBC W/AUTO DIFF WBC: CPT | Performed by: INTERNAL MEDICINE

## 2023-09-18 RX ORDER — IBUPROFEN 200 MG
1 TABLET ORAL DAILY
Qty: 28 PATCH | Refills: 0 | Status: SHIPPED | OUTPATIENT
Start: 2023-09-18

## 2023-09-18 RX ADMIN — MUPIROCIN: 20 OINTMENT TOPICAL at 08:09

## 2023-09-18 RX ADMIN — SULFAMETHOXAZOLE AND TRIMETHOPRIM 2 TABLET: 800; 160 TABLET ORAL at 05:09

## 2023-09-18 RX ADMIN — Medication 30 MG: at 08:09

## 2023-09-18 RX ADMIN — BICTEGRAVIR SODIUM, EMTRICITABINE, AND TENOFOVIR ALAFENAMIDE FUMARATE 1 TABLET: 50; 200; 25 TABLET ORAL at 08:09

## 2023-09-18 RX ADMIN — OSELTAMIVIR PHOSPHATE 75 MG: 75 CAPSULE ORAL at 08:09

## 2023-09-18 RX ADMIN — ALUMINUM HYDROXIDE, MAGNESIUM HYDROXIDE, AND SIMETHICONE 30 ML: 200; 200; 20 SUSPENSION ORAL at 01:09

## 2023-09-18 RX ADMIN — GUAIFENESIN 600 MG: 600 TABLET, EXTENDED RELEASE ORAL at 08:09

## 2023-09-18 NOTE — ASSESSMENT & PLAN NOTE
Patient with a history of COPD and active Smoker, AIDS with CD4 surya <200 who presented with fever/chills, acute cough with dry cough and SOB and found to have Influenza A.  Temp in ED was 102.9.  Pox was 91 on RA and placed on O2NC and eventually placed on BiPAP and admitted to ICU.  CXR on admission with interstitial findings could reflect edema or other nonspecific pneumonitis noting overall accentuated by shallow inspiratory effort.  No large focal consolidation.  CT Chest wo done and showed bronchiectasis.  Mild emphysematous changes.  No focal consolidation.  Labs in ED with WBC 4.51 without left shift, Trop negative, BNP negative, Lactic Acid negative x 2, , Procal negative, Influenza A+.  Patient given a dose of IV Vanc and Zosyn in the ED and changed to Bactrim DS 2 pills q8 to cover PJP.  Patient started on Tamilfu.  He was weaned off BiPAP and O2 and Pox stable on RA.  Transferred out of ICU on 9/16/2023.  Now Afebrile for 24 hours.  I suspect this is all just the Influenza A.  Last dose of Tamiflu is today.  Sputum PJP PCR negative, so will discontinue treatment dose of Bactrim DS and continue with daily prophylaxis dose.  Blood cultures remain negative.  Discharge today.    Plan:  Continue with Tamiflu - day #5/5  Change Bactrim DS to once a day for PJP prophylaxis  Discharge today - patient to return to [place] where he is staying at a drug rehab center.

## 2023-09-18 NOTE — PLAN OF CARE
Pt remained free of falls and injuries throughout shift. AAOx4. Pt calm and cooperative. Purposeful hourly rounding performed. Pt swallows meds whole. IV flushed and saline locked. Managed c/o HA pain with PRN Tylenol. Pt received scheduled Mucinex tablet and dextromethorphan cough syrup as ordered. Patient ambulates independently to toilet. VSS on room air. Pt resting comfortably in bed, denies pain, denies needs at this time. Bed low and locked, call light in reach. Side rails up x2. Report given to SHIRA Muñoz.

## 2023-09-18 NOTE — PLAN OF CARE
Patient educated on discharge instructions and verbalized understanding.  All questions answered to patient's satisfaction.  IV removed without complication.  Patient to be transported off unit via pt transport.     Problem: Adult Inpatient Plan of Care  Goal: Plan of Care Review  Outcome: Met  Goal: Patient-Specific Goal (Individualized)  Outcome: Met  Goal: Absence of Hospital-Acquired Illness or Injury  Outcome: Met  Goal: Optimal Comfort and Wellbeing  Outcome: Met  Goal: Readiness for Transition of Care  Outcome: Met     Problem: Diabetes Comorbidity  Goal: Blood Glucose Level Within Targeted Range  Outcome: Met     Problem: Adjustment to Illness (Sepsis/Septic Shock)  Goal: Optimal Coping  Outcome: Met     Problem: Bleeding (Sepsis/Septic Shock)  Goal: Absence of Bleeding  Outcome: Met     Problem: Glycemic Control Impaired (Sepsis/Septic Shock)  Goal: Blood Glucose Level Within Desired Range  Outcome: Met     Problem: Infection Progression (Sepsis/Septic Shock)  Goal: Absence of Infection Signs and Symptoms  Outcome: Met     Problem: Nutrition Impaired (Sepsis/Septic Shock)  Goal: Optimal Nutrition Intake  Outcome: Met     Problem: Infection  Goal: Absence of Infection Signs and Symptoms  Outcome: Met
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
## 2023-09-18 NOTE — ASSESSMENT & PLAN NOTE
Glucose stable.  A1C 6.2 on admission.  Home Meds:  Metformin 500mg PO daily.   -Resume Metformin on discharge  -Diabetic Diet  -Follow up with PCP

## 2023-09-18 NOTE — DISCHARGE INSTRUCTIONS
For your Influenza A, you have completed a 5 day course of Tamiflu.  Continue to treat your symptoms as needed.    For your HIV, your CD4 count remains very low.  Please continue your Biktarvy and follow-up with your Provider soon.  Continue with Bactrim DS (Trimethoprim-Sulfamethoxazole) DS once a day to prevent PJP pneumonia.    For your cigarette smoking, please continue to abstain from smoking.  I have prescribed a nicotine patch (14mg/hr) to use daily.  A referral for Smoking Cessation clinic has been placed for your.

## 2023-09-18 NOTE — ASSESSMENT & PLAN NOTE
Dangers of cigarette smoking were reviewed with patient in detail.   -Continue with Nicotine 14mg/hr  -Smoking Cessation referral.
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
## 2023-09-18 NOTE — ASSESSMENT & PLAN NOTE
Patient follows with [facility] and a poor historian.  States he was seen last on the day of admission, as he was referred to the ED from clinic.  CD4 on admission is 49. Claims to be on Biktarvy and adherent with treatment, but this is unlikely.  -Follow up with [facility] soon  -Continue with Biktarvy   -Continue with Bactrim DS once a day for PJP and Toxo prophylaxis

## 2023-09-18 NOTE — PLAN OF CARE
CM met with pt for final discharge planning assessment. Pt will discharge today to [facility], [phone].       [facility] will provide transportation and will come for pt around noon today.    CM attempting to schedule a follow-up apt at [facility].  Meds to be delivered to pt from [facility] prior to discharge.    Pt informed and is in agreement with plan. Pt is ready for discharge from CM perspective.   09/18/23 0904   Final Note   Assessment Type Final Discharge Note   Anticipated Discharge Disposition Home   What phone number can be called within the next 1-3 days to see how you are doing after discharge? [phone]   Hospital Resources/Appts/Education Provided Provided patient/caregiver with written discharge plan information;Provided education on problems/symptoms using teachback;Appointments scheduled and added to AVS   Post-Acute Status   Discharge Delays None known at this time     [facility] ([place])  Discharge Final Note    Primary Care Provider: [place]    Expected Discharge Date:     Final Discharge Note (most recent)       Final Note - 09/18/23 0904          Final Note    Assessment Type Final Discharge Note (P)      Anticipated Discharge Disposition Home or Self Care (P)      What phone number can be called within the next 1-3 days to see how you are doing after discharge? [phone] (P)      Hospital Resources/Appts/Education Provided Provided patient/caregiver with written discharge plan information;Provided education on problems/symptoms using teachback;Appointments scheduled and added to AVS (P)         Post-Acute Status    Discharge Delays None known at this time (P)                      Important Message from Medicare

## 2023-09-19 LAB
BACTERIA BLD CULT: NORMAL
BACTERIA BLD CULT: NORMAL

## 2023-09-21 ENCOUNTER — PATIENT OUTREACH (OUTPATIENT)
Dept: ADMINISTRATIVE | Facility: CLINIC | Age: 58
End: 2023-09-21
Payer: MEDICAID

## 2023-11-14 DIAGNOSIS — G57.92 NEUROPATHY OF LEFT FOOT: Primary | ICD-10-CM

## 2023-11-14 DIAGNOSIS — M79.605 LEG PAIN, LEFT: ICD-10-CM

## 2023-12-18 PROBLEM — J96.01 ACUTE HYPOXEMIC RESPIRATORY FAILURE: Status: RESOLVED | Noted: 2023-09-14 | Resolved: 2023-12-18

## 2023-12-18 PROBLEM — A41.9 SEPSIS: Status: RESOLVED | Noted: 2023-09-14 | Resolved: 2023-12-18

## 2024-07-24 ENCOUNTER — HOSPITAL ENCOUNTER (EMERGENCY)
Facility: HOSPITAL | Age: 59
Discharge: HOME | End: 2024-07-24
Attending: EMERGENCY MEDICINE
Payer: MEDICAID

## 2024-07-24 VITALS
SYSTOLIC BLOOD PRESSURE: 143 MMHG | DIASTOLIC BLOOD PRESSURE: 90 MMHG | WEIGHT: 175 LBS | TEMPERATURE: 98 F | OXYGEN SATURATION: 96 % | RESPIRATION RATE: 19 BRPM | BODY MASS INDEX: 23.7 KG/M2 | HEART RATE: 63 BPM | HEIGHT: 72 IN

## 2024-07-24 DIAGNOSIS — J98.01 BRONCHOSPASM: ICD-10-CM

## 2024-07-24 DIAGNOSIS — E86.0 DEHYDRATION: ICD-10-CM

## 2024-07-24 DIAGNOSIS — R06.02 SOB (SHORTNESS OF BREATH): Primary | ICD-10-CM

## 2024-07-24 LAB
ALBUMIN SERPL BCP-MCNC: 4.3 G/DL (ref 3.5–5.2)
ALP SERPL-CCNC: 48 U/L (ref 55–135)
ALT SERPL W/O P-5'-P-CCNC: 16 U/L (ref 10–44)
ANION GAP SERPL CALC-SCNC: 8 MMOL/L (ref 8–16)
AST SERPL-CCNC: 24 U/L (ref 10–40)
BASOPHILS # BLD AUTO: 0.04 K/UL (ref 0–0.2)
BASOPHILS NFR BLD: 0.9 % (ref 0–1.9)
BILIRUB SERPL-MCNC: 0.5 MG/DL (ref 0.1–1)
BNP SERPL-MCNC: 14 PG/ML (ref 0–99)
BUN SERPL-MCNC: 24 MG/DL (ref 6–20)
CALCIUM SERPL-MCNC: 8.8 MG/DL (ref 8.7–10.5)
CHLORIDE SERPL-SCNC: 108 MMOL/L (ref 95–110)
CO2 SERPL-SCNC: 25 MMOL/L (ref 23–29)
CREAT SERPL-MCNC: 1.1 MG/DL (ref 0.5–1.4)
DIFFERENTIAL METHOD BLD: ABNORMAL
EOSINOPHIL # BLD AUTO: 0.5 K/UL (ref 0–0.5)
EOSINOPHIL NFR BLD: 11.3 % (ref 0–8)
ERYTHROCYTE [DISTWIDTH] IN BLOOD BY AUTOMATED COUNT: 16.5 % (ref 11.5–14.5)
EST. GFR  (NO RACE VARIABLE): >60 ML/MIN/1.73 M^2
GLUCOSE SERPL-MCNC: 98 MG/DL (ref 70–110)
HCT VFR BLD AUTO: 41.9 % (ref 40–54)
HGB BLD-MCNC: 13.2 G/DL (ref 14–18)
IMM GRANULOCYTES # BLD AUTO: 0.01 K/UL (ref 0–0.04)
IMM GRANULOCYTES NFR BLD AUTO: 0.2 % (ref 0–0.5)
LYMPHOCYTES # BLD AUTO: 1.8 K/UL (ref 1–4.8)
LYMPHOCYTES NFR BLD: 40.4 % (ref 18–48)
MAGNESIUM SERPL-MCNC: 2 MG/DL (ref 1.6–2.6)
MCH RBC QN AUTO: 24 PG (ref 27–31)
MCHC RBC AUTO-ENTMCNC: 31.5 G/DL (ref 32–36)
MCV RBC AUTO: 76 FL (ref 82–98)
MONOCYTES # BLD AUTO: 0.5 K/UL (ref 0.3–1)
MONOCYTES NFR BLD: 11.1 % (ref 4–15)
NEUTROPHILS # BLD AUTO: 1.6 K/UL (ref 1.8–7.7)
NEUTROPHILS NFR BLD: 36.1 % (ref 38–73)
NRBC BLD-RTO: 0 /100 WBC
OHS QRS DURATION: 98 MS
OHS QTC CALCULATION: 386 MS
PHOSPHATE SERPL-MCNC: 3.5 MG/DL (ref 2.7–4.5)
PLATELET # BLD AUTO: 208 K/UL (ref 150–450)
PMV BLD AUTO: 10.3 FL (ref 9.2–12.9)
POTASSIUM SERPL-SCNC: 4 MMOL/L (ref 3.5–5.1)
PROT SERPL-MCNC: 7.1 G/DL (ref 6–8.4)
RBC # BLD AUTO: 5.49 M/UL (ref 4.6–6.2)
SODIUM SERPL-SCNC: 141 MMOL/L (ref 136–145)
TROPONIN I SERPL HS-MCNC: 3 PG/ML (ref 0–14.9)
TROPONIN I SERPL HS-MCNC: 3.3 PG/ML (ref 0–14.9)
WBC # BLD AUTO: 4.33 K/UL (ref 3.9–12.7)

## 2024-07-24 PROCEDURE — 85025 COMPLETE CBC W/AUTO DIFF WBC: CPT | Performed by: EMERGENCY MEDICINE

## 2024-07-24 PROCEDURE — 94761 N-INVAS EAR/PLS OXIMETRY MLT: CPT

## 2024-07-24 PROCEDURE — 80053 COMPREHEN METABOLIC PANEL: CPT | Performed by: EMERGENCY MEDICINE

## 2024-07-24 PROCEDURE — 83735 ASSAY OF MAGNESIUM: CPT | Performed by: EMERGENCY MEDICINE

## 2024-07-24 PROCEDURE — 93005 ELECTROCARDIOGRAM TRACING: CPT | Performed by: GENERAL PRACTICE

## 2024-07-24 PROCEDURE — 84100 ASSAY OF PHOSPHORUS: CPT | Performed by: EMERGENCY MEDICINE

## 2024-07-24 PROCEDURE — 99285 EMERGENCY DEPT VISIT HI MDM: CPT | Mod: 25

## 2024-07-24 PROCEDURE — 96374 THER/PROPH/DIAG INJ IV PUSH: CPT

## 2024-07-24 PROCEDURE — 83880 ASSAY OF NATRIURETIC PEPTIDE: CPT | Performed by: EMERGENCY MEDICINE

## 2024-07-24 PROCEDURE — 63600175 PHARM REV CODE 636 W HCPCS: Performed by: EMERGENCY MEDICINE

## 2024-07-24 PROCEDURE — 93010 ELECTROCARDIOGRAM REPORT: CPT | Mod: ,,, | Performed by: GENERAL PRACTICE

## 2024-07-24 PROCEDURE — 84484 ASSAY OF TROPONIN QUANT: CPT | Performed by: EMERGENCY MEDICINE

## 2024-07-24 RX ORDER — PREDNISONE 20 MG/1
40 TABLET ORAL DAILY
Qty: 10 TABLET | Refills: 0 | Status: SHIPPED | OUTPATIENT
Start: 2024-07-24 | End: 2024-07-29

## 2024-07-24 RX ORDER — ALBUTEROL SULFATE 90 UG/1
1-2 AEROSOL, METERED RESPIRATORY (INHALATION) EVERY 6 HOURS PRN
Qty: 18 G | Refills: 1 | Status: SHIPPED | OUTPATIENT
Start: 2024-07-24 | End: 2025-07-24

## 2024-07-24 RX ORDER — DOXYCYCLINE 100 MG/1
100 CAPSULE ORAL 2 TIMES DAILY
Qty: 14 CAPSULE | Refills: 0 | Status: SHIPPED | OUTPATIENT
Start: 2024-07-24 | End: 2024-07-31

## 2024-07-24 RX ORDER — METHYLPREDNISOLONE SOD SUCC 125 MG
125 VIAL (EA) INJECTION
Status: COMPLETED | OUTPATIENT
Start: 2024-07-24 | End: 2024-07-24

## 2024-07-24 RX ADMIN — METHYLPREDNISOLONE SODIUM SUCCINATE 125 MG: 125 INJECTION, POWDER, FOR SOLUTION INTRAMUSCULAR; INTRAVENOUS at 08:07

## 2024-07-24 NOTE — DISCHARGE INSTRUCTIONS
Stop smoking.  Encourage oral fluids.  Use albuterol inhaler every 6 hours.  Take prednisone daily as directed starting tomorrow.  Doxycycline twice daily.  Return if needed.

## 2024-07-24 NOTE — ED PROVIDER NOTES
Encounter Date: 7/24/2024       History     Chief Complaint   Patient presents with    Shortness of Breath     SOB, found on floor struggling to breathe with inspiratory and expiratory wheezing, given duo-neb by EMS     59-year-old male who has had a prior history of GERD, drug addiction, depression, type 2 diabetes, HIV, psychiatric history, presents with complaints of shortness of breath of 1 month duration.  When questioned as to was worse today the patient could not verify.  He was given a DuoNeb treatment EN route by EMS and states he felt better subsequently.  Patient was still smokes a proximally 3/4 of pack cigarettes daily.  He has had some cough which is for the most part nonproductive.  No fever.  He has wheezing.  No significant chest pain.  No complaint of any abdominal pain nausea vomiting.  No palpitations.  No history of CHF.      Review of patient's allergies indicates:   Allergen Reactions    Tramadol Diarrhea     Past Medical History:   Diagnosis Date    Acid reflux     Addiction to drug     Anxiety     Depression     Diabetes mellitus, type 2     History of psychiatric hospitalization     HIV infection     Hx of psychiatric care     Tori     Psychiatric exam requested by authority     Psychiatric problem     Substance abuse     Suicide attempt     Therapy      Past Surgical History:   Procedure Laterality Date    LEG SURGERY      ORIF MANDIBULAR FRACTURE Left 9/9/2022    Procedure: ORIF, FRACTURE, MANDIBLE Reviewed by ROHITH 09/06/22 1201;  Surgeon: Trenton Drew DDS, MD;  Location: [facility] MAIN OR;  Service: Oral Surgery;  Laterality: Left;     Family History   Problem Relation Name Age of Onset    Colon cancer Mother      No Known Problems Father       Social History     Tobacco Use    Smoking status: Every Day     Current packs/day: 1.00     Average packs/day: 1 pack/day for 20.6 years (20.6 ttl pk-yrs)     Types: Cigarettes     Start date: 1/1/2004    Smokeless tobacco: Never    Tobacco comments:      Patient declined nicotine patches   Substance Use Topics    Alcohol use: Not Currently    Drug use: Not Currently     Comment: currently in a [employer] program, not using that anymore     Review of Systems   Constitutional:  Negative for fever.   HENT:  Negative for congestion, sinus pain, sore throat and trouble swallowing.    Respiratory:  Positive for cough and shortness of breath.    Cardiovascular:  Negative for chest pain.   Gastrointestinal:  Negative for abdominal pain, constipation, diarrhea, nausea and vomiting.   Genitourinary:  Negative for difficulty urinating and dysuria.   Skin:  Negative for pallor.   Neurological:  Negative for headaches.   All other systems reviewed and are negative.      Physical Exam     Initial Vitals [07/24/24 0555]   BP Pulse Resp Temp SpO2   (!) 142/92 75 18 98 °F (36.7 °C) 97 %      MAP       --         Physical Exam    Vitals reviewed.  Constitutional: He appears well-developed and well-nourished. He is not diaphoretic. No distress.   In no acute distress   HENT:   Head: Normocephalic and atraumatic.   Right Ear: External ear normal.   Left Ear: External ear normal.   Nose: Nose normal.   Mouth/Throat: Oropharynx is clear and moist.   Eyes: Conjunctivae and EOM are normal. Pupils are equal, round, and reactive to light.   Neck: Neck supple. No JVD present.   Normal range of motion.  Cardiovascular:  Normal rate, regular rhythm, normal heart sounds and intact distal pulses.     Exam reveals no gallop and no friction rub.       No murmur heard.  Pulmonary/Chest: Breath sounds normal. No stridor. No respiratory distress. He has no wheezes. He has no rhonchi. He has no rales. He exhibits no tenderness.   Abdominal: Abdomen is soft. Bowel sounds are normal. He exhibits no distension. There is no abdominal tenderness. There is no rebound and no guarding.   Musculoskeletal:         General: No tenderness or edema. Normal range of motion.      Cervical back: Normal range of  motion and neck supple.     Lymphadenopathy:     He has no cervical adenopathy.   Neurological: He is alert and oriented to person, place, and time. He has normal strength. GCS score is 15. GCS eye subscore is 4. GCS verbal subscore is 5. GCS motor subscore is 6.   Skin: Skin is warm and dry. Capillary refill takes less than 2 seconds. No erythema. No pallor.   Psychiatric: He has a normal mood and affect. His behavior is normal. Judgment and thought content normal.         ED Course   Procedures  Labs Reviewed   CBC W/ AUTO DIFFERENTIAL - Abnormal       Result Value    WBC 4.33      RBC 5.49      Hemoglobin 13.2 (*)     Hematocrit 41.9      MCV 76 (*)     MCH 24.0 (*)     MCHC 31.5 (*)     RDW 16.5 (*)     Platelets 208      MPV 10.3      Immature Granulocytes 0.2      Gran # (ANC) 1.6 (*)     Immature Grans (Abs) 0.01      Lymph # 1.8      Mono # 0.5      Eos # 0.5      Baso # 0.04      nRBC 0      Gran % 36.1 (*)     Lymph % 40.4      Mono % 11.1      Eosinophil % 11.3 (*)     Basophil % 0.9      Differential Method Automated     COMPREHENSIVE METABOLIC PANEL - Abnormal    Sodium 141      Potassium 4.0      Chloride 108      CO2 25      Glucose 98      BUN 24 (*)     Creatinine 1.1      Calcium 8.8      Total Protein 7.1      Albumin 4.3      Total Bilirubin 0.5      Alkaline Phosphatase 48 (*)     AST 24      ALT 16      eGFR >60.0      Anion Gap 8     B-TYPE NATRIURETIC PEPTIDE    BNP 14     MAGNESIUM    Magnesium 2.0     TROPONIN I HIGH SENSITIVITY    Troponin I High Sensitivity 3.3     PHOSPHORUS    Phosphorus 3.5     TROPONIN I HIGH SENSITIVITY        ECG Results              EKG 12-lead (In process)        Collection Time Result Time QRS Duration OHS QTC Calculation    07/24/24 05:54:45 07/24/24 06:14:58 98 386                     In process by Interface, Lab In [facility] (07/24/24 06:15:03)                   Narrative:    Test Reason : R07.9,    Vent. Rate : 070 BPM     Atrial Rate : 070 BPM     P-R Int :  140 ms          QRS Dur : 098 ms      QT Int : 358 ms       P-R-T Axes : 074 085 070 degrees     QTc Int : 386 ms    Normal sinus rhythm with sinus arrhythmia  Normal ECG  When compared with ECG of 14-SEP-2023 14:35,  Nonspecific T wave abnormality no longer evident in Inferior leads    Referred By: [email or web address]   SELF           Confirmed By:                                   Imaging Results              X-Ray Chest AP Portable (Final result)  Result time 07/24/24 06:35:49      Final result by Wilfrido Webster MD (07/24/24 06:35:49)                   Impression:      No convincing evidence of acute cardiopulmonary disease.      Electronically signed by: Wilfrido Webster  Date:    07/24/2024  Time:    06:35               Narrative:    EXAMINATION:  XR CHEST AP PORTABLE    CLINICAL HISTORY:  shortness of breath;    TECHNIQUE:  Single frontal view of the chest was performed.    COMPARISON:  Chest radiograph performed 09/17/2023, 15:10 hours.    FINDINGS:  Monitoring leads over the chest.  Cardiomediastinal contours appear grossly unchanged within normal limits.    Lungs appear essentially clear.    No definite pneumothorax or large volume pleural effusion.    No acute findings in the visualized abdomen.    Osseous and soft tissue structures appear without definite acute change.                                       Medications   methylPREDNISolone sodium succinate injection 125 mg (125 mg Intravenous Given 7/24/24 0800)     Medical Decision Making  Risk  Prescription drug management.              Attending Attestation:             Attending ED Notes:   ED course and MDM:  This 59-year-old male who is a smoker presented with complaints of shortness of breath and wheezing for which she received a DuoNeb treatment EN route to the hospital.  When his lungs were auscultated only had some mild residual wheezing.  Chest x-ray was negative.  Screening labs obtained were reviewed unremarkable except for some mild dehydration with  normal creatinine and a BUN of 24.  The patient had an EKG showing a normal sinus rhythm with a sinus arrhythmia but otherwise is unremarkable.  His oxygen saturation has been good at 92% on room air and was 94% on room air earlier.  That has no associated chest pain.  His cardiac labs unremarkable.  The patient will be discharged with prescriptions for a metered-dose inhaler of albuterol, prednisone for 5 days and doxycycline for 7 days.  He is to follow up with his primary care provider.  He is also instructed to stop smoking.                             Clinical Impression:  Final diagnoses:  [R06.02] SOB (shortness of breath) (Primary)  [J98.01] Bronchospasm  [E86.0] Dehydration          ED Disposition Condition    Discharge Stable          ED Prescriptions       Medication Sig Dispense Start Date End Date Auth. Provider    albuterol (PROVENTIL/VENTOLIN HFA) 90 mcg/actuation inhaler Inhale 1-2 puffs into the lungs every 6 (six) hours as needed for Wheezing. Rescue 18 g 7/24/2024 7/24/2025 Theodore Laboy Jr., MD    predniSONE (DELTASONE) 20 MG tablet Take 2 tablets (40 mg total) by mouth once daily. for 5 days 10 tablet 7/24/2024 7/29/2024 Theodore Laboy Jr., MD    doxycycline (VIBRAMYCIN) 100 MG Cap Take 1 capsule (100 mg total) by mouth 2 (two) times daily. for 7 days 14 capsule 7/24/2024 7/31/2024 Theodore Laboy Jr., MD          Follow-up Information       Follow up With Specialties Details Why Contact [facility] Internal Medicine, Family Medicine Schedule an appointment as soon as possible for a visit  As needed [id number] [facility] [id number] [phone]               Theodore Laboy Jr., MD  07/24/24 0210

## 2024-07-30 LAB
OHS QRS DURATION: 98 MS
OHS QTC CALCULATION: 386 MS

## 2024-08-21 ENCOUNTER — HOSPITAL ENCOUNTER (OUTPATIENT)
Facility: HOSPITAL | Age: 59
Discharge: HOME | End: 2024-08-23
Attending: EMERGENCY MEDICINE | Admitting: STUDENT IN AN ORGANIZED HEALTH CARE EDUCATION/TRAINING PROGRAM
Payer: MEDICAID

## 2024-08-21 DIAGNOSIS — Z72.0 TOBACCO USE: ICD-10-CM

## 2024-08-21 DIAGNOSIS — J44.1 COPD EXACERBATION: Primary | ICD-10-CM

## 2024-08-21 DIAGNOSIS — B20 HIV INFECTION, UNSPECIFIED SYMPTOM STATUS: ICD-10-CM

## 2024-08-21 DIAGNOSIS — E11.9 TYPE 2 DIABETES MELLITUS WITHOUT COMPLICATION, WITHOUT LONG-TERM CURRENT USE OF INSULIN: ICD-10-CM

## 2024-08-21 DIAGNOSIS — Z21 ASYMPTOMATIC HIV INFECTION, WITH NO HISTORY OF HIV-RELATED ILLNESS: ICD-10-CM

## 2024-08-21 DIAGNOSIS — R09.02 HYPOXIA: ICD-10-CM

## 2024-08-21 DIAGNOSIS — R07.9 CHEST PAIN: ICD-10-CM

## 2024-08-21 LAB
ALBUMIN SERPL BCP-MCNC: 3.9 G/DL (ref 3.5–5.2)
ALLENS TEST: ABNORMAL
ALP SERPL-CCNC: 38 U/L (ref 55–135)
ALT SERPL W/O P-5'-P-CCNC: 15 U/L (ref 10–44)
ANION GAP SERPL CALC-SCNC: 6 MMOL/L (ref 8–16)
AST SERPL-CCNC: 23 U/L (ref 10–40)
BASOPHILS # BLD AUTO: 0.03 K/UL (ref 0–0.2)
BASOPHILS NFR BLD: 0.7 % (ref 0–1.9)
BILIRUB SERPL-MCNC: 0.3 MG/DL (ref 0.1–1)
BNP SERPL-MCNC: 26 PG/ML (ref 0–99)
BUN SERPL-MCNC: 17 MG/DL (ref 6–20)
CALCIUM SERPL-MCNC: 8.3 MG/DL (ref 8.7–10.5)
CHLORIDE SERPL-SCNC: 108 MMOL/L (ref 95–110)
CO2 SERPL-SCNC: 27 MMOL/L (ref 23–29)
CREAT SERPL-MCNC: 1 MG/DL (ref 0.5–1.4)
DELSYS: ABNORMAL
DIFFERENTIAL METHOD BLD: ABNORMAL
EOSINOPHIL # BLD AUTO: 0.3 K/UL (ref 0–0.5)
EOSINOPHIL NFR BLD: 6.4 % (ref 0–8)
ERYTHROCYTE [DISTWIDTH] IN BLOOD BY AUTOMATED COUNT: 17.1 % (ref 11.5–14.5)
EST. GFR  (NO RACE VARIABLE): >60 ML/MIN/1.73 M^2
FLOW: 3
GLUCOSE SERPL-MCNC: 131 MG/DL (ref 70–110)
GLUCOSE SERPL-MCNC: 139 MG/DL (ref 70–110)
HCO3 UR-SCNC: 26.7 MMOL/L (ref 24–28)
HCT VFR BLD AUTO: 39.8 % (ref 40–54)
HCT VFR BLD CALC: 41 %PCV (ref 36–54)
HGB BLD-MCNC: 12.6 G/DL (ref 14–18)
IMM GRANULOCYTES # BLD AUTO: 0.01 K/UL (ref 0–0.04)
IMM GRANULOCYTES NFR BLD AUTO: 0.2 % (ref 0–0.5)
INFLUENZA A, MOLECULAR: NEGATIVE
INFLUENZA B, MOLECULAR: NEGATIVE
LYMPHOCYTES # BLD AUTO: 0.8 K/UL (ref 1–4.8)
LYMPHOCYTES NFR BLD: 19.1 % (ref 18–48)
MAGNESIUM SERPL-MCNC: 2.6 MG/DL (ref 1.6–2.6)
MCH RBC QN AUTO: 24 PG (ref 27–31)
MCHC RBC AUTO-ENTMCNC: 31.7 G/DL (ref 32–36)
MCV RBC AUTO: 76 FL (ref 82–98)
MODE: ABNORMAL
MONOCYTES # BLD AUTO: 0.4 K/UL (ref 0.3–1)
MONOCYTES NFR BLD: 8.7 % (ref 4–15)
NEUTROPHILS # BLD AUTO: 2.6 K/UL (ref 1.8–7.7)
NEUTROPHILS NFR BLD: 64.9 % (ref 38–73)
NRBC BLD-RTO: 0 /100 WBC
OHS QRS DURATION: 92 MS
OHS QTC CALCULATION: 394 MS
PCO2 BLDA: 51.1 MMHG (ref 35–45)
PH SMN: 7.33 [PH] (ref 7.35–7.45)
PLATELET # BLD AUTO: 203 K/UL (ref 150–450)
PMV BLD AUTO: 10.8 FL (ref 9.2–12.9)
PO2 BLDA: 110 MMHG (ref 80–100)
POC BE: 1 MMOL/L
POC IONIZED CALCIUM: 1.23 MMOL/L (ref 1.06–1.42)
POC SATURATED O2: 98 % (ref 95–100)
POC TCO2: 28 MMOL/L (ref 23–27)
POTASSIUM BLD-SCNC: 3.5 MMOL/L (ref 3.5–5.1)
POTASSIUM SERPL-SCNC: 3.8 MMOL/L (ref 3.5–5.1)
PROT SERPL-MCNC: 6.4 G/DL (ref 6–8.4)
RBC # BLD AUTO: 5.25 M/UL (ref 4.6–6.2)
SAMPLE: ABNORMAL
SARS-COV-2 RDRP RESP QL NAA+PROBE: NEGATIVE
SITE: ABNORMAL
SODIUM BLD-SCNC: 142 MMOL/L (ref 136–145)
SODIUM SERPL-SCNC: 141 MMOL/L (ref 136–145)
SPECIMEN SOURCE: NORMAL
TROPONIN I SERPL HS-MCNC: 3.8 PG/ML (ref 0–14.9)
TROPONIN I SERPL HS-MCNC: 7.1 PG/ML (ref 0–14.9)
WBC # BLD AUTO: 4.04 K/UL (ref 3.9–12.7)

## 2024-08-21 PROCEDURE — 99900035 HC TECH TIME PER 15 MIN (STAT)

## 2024-08-21 PROCEDURE — 25000003 PHARM REV CODE 250: Performed by: STUDENT IN AN ORGANIZED HEALTH CARE EDUCATION/TRAINING PROGRAM

## 2024-08-21 PROCEDURE — 27000221 HC OXYGEN, UP TO 24 HOURS

## 2024-08-21 PROCEDURE — 83735 ASSAY OF MAGNESIUM: CPT | Performed by: EMERGENCY MEDICINE

## 2024-08-21 PROCEDURE — 96376 TX/PRO/DX INJ SAME DRUG ADON: CPT

## 2024-08-21 PROCEDURE — 63600175 PHARM REV CODE 636 W HCPCS: Performed by: EMERGENCY MEDICINE

## 2024-08-21 PROCEDURE — U0002 COVID-19 LAB TEST NON-CDC: HCPCS | Performed by: EMERGENCY MEDICINE

## 2024-08-21 PROCEDURE — 93010 ELECTROCARDIOGRAM REPORT: CPT | Mod: ,,, | Performed by: GENERAL PRACTICE

## 2024-08-21 PROCEDURE — G0378 HOSPITAL OBSERVATION PER HR: HCPCS

## 2024-08-21 PROCEDURE — 84484 ASSAY OF TROPONIN QUANT: CPT | Mod: 91 | Performed by: EMERGENCY MEDICINE

## 2024-08-21 PROCEDURE — 96366 THER/PROPH/DIAG IV INF ADDON: CPT

## 2024-08-21 PROCEDURE — 83880 ASSAY OF NATRIURETIC PEPTIDE: CPT | Performed by: EMERGENCY MEDICINE

## 2024-08-21 PROCEDURE — 96365 THER/PROPH/DIAG IV INF INIT: CPT

## 2024-08-21 PROCEDURE — 25000242 PHARM REV CODE 250 ALT 637 W/ HCPCS: Performed by: STUDENT IN AN ORGANIZED HEALTH CARE EDUCATION/TRAINING PROGRAM

## 2024-08-21 PROCEDURE — 36600 WITHDRAWAL OF ARTERIAL BLOOD: CPT

## 2024-08-21 PROCEDURE — 96375 TX/PRO/DX INJ NEW DRUG ADDON: CPT

## 2024-08-21 PROCEDURE — 80053 COMPREHEN METABOLIC PANEL: CPT | Performed by: EMERGENCY MEDICINE

## 2024-08-21 PROCEDURE — 63700000 PHARM REV CODE 250 ALT 637 W/O HCPCS: Performed by: STUDENT IN AN ORGANIZED HEALTH CARE EDUCATION/TRAINING PROGRAM

## 2024-08-21 PROCEDURE — 87502 INFLUENZA DNA AMP PROBE: CPT | Performed by: EMERGENCY MEDICINE

## 2024-08-21 PROCEDURE — 36415 COLL VENOUS BLD VENIPUNCTURE: CPT | Performed by: EMERGENCY MEDICINE

## 2024-08-21 PROCEDURE — 85025 COMPLETE CBC W/AUTO DIFF WBC: CPT | Performed by: EMERGENCY MEDICINE

## 2024-08-21 PROCEDURE — 94640 AIRWAY INHALATION TREATMENT: CPT

## 2024-08-21 PROCEDURE — 94799 UNLISTED PULMONARY SVC/PX: CPT

## 2024-08-21 PROCEDURE — 82803 BLOOD GASES ANY COMBINATION: CPT

## 2024-08-21 PROCEDURE — 94640 AIRWAY INHALATION TREATMENT: CPT | Mod: XB

## 2024-08-21 PROCEDURE — 93005 ELECTROCARDIOGRAM TRACING: CPT | Performed by: GENERAL PRACTICE

## 2024-08-21 PROCEDURE — 99285 EMERGENCY DEPT VISIT HI MDM: CPT | Mod: 25

## 2024-08-21 PROCEDURE — 25000242 PHARM REV CODE 250 ALT 637 W/ HCPCS: Performed by: EMERGENCY MEDICINE

## 2024-08-21 PROCEDURE — 63600175 PHARM REV CODE 636 W HCPCS: Performed by: STUDENT IN AN ORGANIZED HEALTH CARE EDUCATION/TRAINING PROGRAM

## 2024-08-21 PROCEDURE — 94761 N-INVAS EAR/PLS OXIMETRY MLT: CPT | Mod: XB

## 2024-08-21 PROCEDURE — 99900031 HC PATIENT EDUCATION (STAT)

## 2024-08-21 RX ORDER — METFORMIN HYDROCHLORIDE 500 MG/1
500 TABLET ORAL DAILY
COMMUNITY

## 2024-08-21 RX ORDER — ATORVASTATIN CALCIUM 10 MG/1
10 TABLET, FILM COATED ORAL DAILY
COMMUNITY
Start: 2024-08-06

## 2024-08-21 RX ORDER — PREDNISONE 20 MG/1
40 TABLET ORAL DAILY
Status: DISCONTINUED | OUTPATIENT
Start: 2024-08-22 | End: 2024-08-23 | Stop reason: HOSPADM

## 2024-08-21 RX ORDER — GLUCAGON 1 MG
1 KIT INJECTION
Status: DISCONTINUED | OUTPATIENT
Start: 2024-08-21 | End: 2024-08-23 | Stop reason: HOSPADM

## 2024-08-21 RX ORDER — IPRATROPIUM BROMIDE AND ALBUTEROL SULFATE 2.5; .5 MG/3ML; MG/3ML
3 SOLUTION RESPIRATORY (INHALATION)
Status: DISCONTINUED | OUTPATIENT
Start: 2024-08-21 | End: 2024-08-23 | Stop reason: HOSPADM

## 2024-08-21 RX ORDER — ONDANSETRON HYDROCHLORIDE 2 MG/ML
4 INJECTION, SOLUTION INTRAVENOUS EVERY 6 HOURS PRN
Status: DISCONTINUED | OUTPATIENT
Start: 2024-08-21 | End: 2024-08-23 | Stop reason: HOSPADM

## 2024-08-21 RX ORDER — IBUPROFEN 200 MG
24 TABLET ORAL
Status: DISCONTINUED | OUTPATIENT
Start: 2024-08-21 | End: 2024-08-23 | Stop reason: HOSPADM

## 2024-08-21 RX ORDER — IBUPROFEN 200 MG
16 TABLET ORAL
Status: DISCONTINUED | OUTPATIENT
Start: 2024-08-21 | End: 2024-08-23 | Stop reason: HOSPADM

## 2024-08-21 RX ORDER — ALUMINUM HYDROXIDE, MAGNESIUM HYDROXIDE, AND SIMETHICONE 1200; 120; 1200 MG/30ML; MG/30ML; MG/30ML
30 SUSPENSION ORAL 4 TIMES DAILY PRN
Status: DISCONTINUED | OUTPATIENT
Start: 2024-08-21 | End: 2024-08-23 | Stop reason: HOSPADM

## 2024-08-21 RX ORDER — ATORVASTATIN CALCIUM 10 MG/1
10 TABLET, FILM COATED ORAL DAILY
Status: DISCONTINUED | OUTPATIENT
Start: 2024-08-21 | End: 2024-08-23 | Stop reason: HOSPADM

## 2024-08-21 RX ORDER — TIZANIDINE 4 MG/1
4 TABLET ORAL NIGHTLY PRN
Status: DISCONTINUED | OUTPATIENT
Start: 2024-08-21 | End: 2024-08-23 | Stop reason: HOSPADM

## 2024-08-21 RX ORDER — PREGABALIN 75 MG/1
75 CAPSULE ORAL 2 TIMES DAILY
COMMUNITY
Start: 2024-08-06

## 2024-08-21 RX ORDER — SODIUM CHLORIDE 0.9 % (FLUSH) 0.9 %
2 SYRINGE (ML) INJECTION EVERY 8 HOURS PRN
Status: DISCONTINUED | OUTPATIENT
Start: 2024-08-21 | End: 2024-08-23 | Stop reason: HOSPADM

## 2024-08-21 RX ORDER — PRAZOSIN HYDROCHLORIDE 1 MG/1
1 CAPSULE ORAL NIGHTLY
Status: DISCONTINUED | OUTPATIENT
Start: 2024-08-21 | End: 2024-08-23 | Stop reason: HOSPADM

## 2024-08-21 RX ORDER — SODIUM CHLORIDE 0.9 % (FLUSH) 0.9 %
3 SYRINGE (ML) INJECTION
Status: DISCONTINUED | OUTPATIENT
Start: 2024-08-21 | End: 2024-08-23 | Stop reason: HOSPADM

## 2024-08-21 RX ORDER — LEVALBUTEROL INHALATION SOLUTION 1.25 MG/3ML
3.75 SOLUTION RESPIRATORY (INHALATION)
Status: COMPLETED | OUTPATIENT
Start: 2024-08-21 | End: 2024-08-21

## 2024-08-21 RX ORDER — LEVALBUTEROL INHALATION SOLUTION 1.25 MG/3ML
1.25 SOLUTION RESPIRATORY (INHALATION)
Status: COMPLETED | OUTPATIENT
Start: 2024-08-21 | End: 2024-08-21

## 2024-08-21 RX ORDER — TALC
6 POWDER (GRAM) TOPICAL NIGHTLY PRN
Status: DISCONTINUED | OUTPATIENT
Start: 2024-08-21 | End: 2024-08-23 | Stop reason: HOSPADM

## 2024-08-21 RX ORDER — AZITHROMYCIN 250 MG/1
500 TABLET, FILM COATED ORAL ONCE
Status: COMPLETED | OUTPATIENT
Start: 2024-08-21 | End: 2024-08-21

## 2024-08-21 RX ORDER — NALOXONE HCL 0.4 MG/ML
0.02 VIAL (ML) INJECTION
Status: DISCONTINUED | OUTPATIENT
Start: 2024-08-21 | End: 2024-08-23 | Stop reason: HOSPADM

## 2024-08-21 RX ORDER — CLOTRIMAZOLE 1 %
1 CREAM (GRAM) TOPICAL 2 TIMES DAILY
COMMUNITY
Start: 2024-08-06

## 2024-08-21 RX ORDER — METHYLPREDNISOLONE SOD SUCC 125 MG
125 VIAL (EA) INJECTION
Status: COMPLETED | OUTPATIENT
Start: 2024-08-21 | End: 2024-08-21

## 2024-08-21 RX ORDER — AMOXICILLIN 250 MG
1 CAPSULE ORAL DAILY PRN
Status: DISCONTINUED | OUTPATIENT
Start: 2024-08-21 | End: 2024-08-23 | Stop reason: HOSPADM

## 2024-08-21 RX ORDER — AZITHROMYCIN 250 MG/1
250 TABLET, FILM COATED ORAL DAILY
Status: DISCONTINUED | OUTPATIENT
Start: 2024-08-22 | End: 2024-08-23 | Stop reason: HOSPADM

## 2024-08-21 RX ORDER — TIZANIDINE 4 MG/1
4 TABLET ORAL NIGHTLY PRN
COMMUNITY
Start: 2024-08-06

## 2024-08-21 RX ORDER — IBUPROFEN 800 MG/1
800 TABLET ORAL EVERY 8 HOURS PRN
COMMUNITY
Start: 2024-08-06

## 2024-08-21 RX ORDER — ACETAMINOPHEN 325 MG/1
650 TABLET ORAL EVERY 4 HOURS PRN
Status: DISCONTINUED | OUTPATIENT
Start: 2024-08-21 | End: 2024-08-23 | Stop reason: HOSPADM

## 2024-08-21 RX ORDER — MAGNESIUM SULFATE HEPTAHYDRATE 40 MG/ML
2 INJECTION, SOLUTION INTRAVENOUS ONCE
Status: COMPLETED | OUTPATIENT
Start: 2024-08-21 | End: 2024-08-21

## 2024-08-21 RX ORDER — SULFAMETHOXAZOLE AND TRIMETHOPRIM 800; 160 MG/1; MG/1
1 TABLET ORAL 2 TIMES DAILY
Status: DISCONTINUED | OUTPATIENT
Start: 2024-08-21 | End: 2024-08-23 | Stop reason: HOSPADM

## 2024-08-21 RX ORDER — PREGABALIN 75 MG/1
75 CAPSULE ORAL 2 TIMES DAILY
Status: DISCONTINUED | OUTPATIENT
Start: 2024-08-21 | End: 2024-08-23 | Stop reason: HOSPADM

## 2024-08-21 RX ORDER — ACETAMINOPHEN 325 MG/1
650 TABLET ORAL EVERY 8 HOURS PRN
Status: DISCONTINUED | OUTPATIENT
Start: 2024-08-21 | End: 2024-08-23 | Stop reason: HOSPADM

## 2024-08-21 RX ADMIN — METHYLPREDNISOLONE SODIUM SUCCINATE 125 MG: 125 INJECTION, POWDER, FOR SOLUTION INTRAMUSCULAR; INTRAVENOUS at 05:08

## 2024-08-21 RX ADMIN — AZITHROMYCIN DIHYDRATE 500 MG: 250 TABLET ORAL at 10:08

## 2024-08-21 RX ADMIN — ACETAMINOPHEN 650 MG: 325 TABLET ORAL at 11:08

## 2024-08-21 RX ADMIN — PREGABALIN 75 MG: 75 CAPSULE ORAL at 10:08

## 2024-08-21 RX ADMIN — ATORVASTATIN CALCIUM 10 MG: 10 TABLET, FILM COATED ORAL at 10:08

## 2024-08-21 RX ADMIN — MAGNESIUM SULFATE HEPTAHYDRATE 2 G: 40 INJECTION, SOLUTION INTRAVENOUS at 05:08

## 2024-08-21 RX ADMIN — PRAZOSIN HYDROCHLORIDE 1 MG: 1 CAPSULE ORAL at 11:08

## 2024-08-21 RX ADMIN — LEVALBUTEROL HYDROCHLORIDE 3.75 MG: 1.25 SOLUTION RESPIRATORY (INHALATION) at 04:08

## 2024-08-21 RX ADMIN — SULFAMETHOXAZOLE AND TRIMETHOPRIM 1 TABLET: 800; 160 TABLET ORAL at 10:08

## 2024-08-21 RX ADMIN — IPRATROPIUM BROMIDE AND ALBUTEROL SULFATE 3 ML: 2.5; .5 SOLUTION RESPIRATORY (INHALATION) at 08:08

## 2024-08-21 RX ADMIN — METHYLPREDNISOLONE SODIUM SUCCINATE 40 MG: 40 INJECTION, POWDER, FOR SOLUTION INTRAMUSCULAR; INTRAVENOUS at 10:08

## 2024-08-21 RX ADMIN — LEVALBUTEROL HYDROCHLORIDE 1.25 MG: 1.25 SOLUTION RESPIRATORY (INHALATION) at 08:08

## 2024-08-21 RX ADMIN — TIZANIDINE 4 MG: 4 TABLET ORAL at 11:08

## 2024-08-21 NOTE — ED PROVIDER NOTES
COPD exacerbation Encounter Date: 8/21/2024       History     Chief Complaint   Patient presents with    Shortness of Breath     Pt stated that he feels like he can barely breathe. Pt stated that he smoked cigarettes yesterday and it made it worse.     59-year-old male who has had a prior history of GERD, drug addiction, depression, type 2 diabetes, HIV, psychiatric history, presents with complaints of shortness of breath of over last 24 hours.  Patient attributes his most recent episode of shortness of breath to smoking cigarettes yesterday.  Since states has had chest tightness, wheezing, shortness of breath.  Patient states symptoms are worsened with walking.  Patient also endorses being out of his home albuterol HFA medications.   Patient does admit that he was a former moderate to heavy smoker.  Smoked approximally 3/4 of pack cigarettes daily.  He has had some cough which is f nonproductive.  Denies fever, no URI symptoms.  He has wheezing.  No significant chest pain.  No complaint of any abdominal pain nausea vomiting.  No palpitations.  No history of CHF.  Upon patient arrival to the emergency department patient found with room air sats of 89-90%.  Denies home oxygen use.      Review of patient's allergies indicates:   Allergen Reactions    Tramadol Diarrhea     Past Medical History:   Diagnosis Date    Acid reflux     Addiction to drug     Anxiety     Depression     Diabetes mellitus, type 2     History of psychiatric hospitalization     HIV infection     Hx of psychiatric care     Tori     Psychiatric exam requested by authority     Psychiatric problem     Substance abuse     Suicide attempt     Therapy      Past Surgical History:   Procedure Laterality Date    LEG SURGERY      ORIF MANDIBULAR FRACTURE Left 9/9/2022    Procedure: ORIF, FRACTURE, MANDIBLE Reviewed by ROHITH 09/06/22 1201;  Surgeon: Trenton Drew DDS, MD;  Location: [facility] MAIN OR;  Service: Oral Surgery;  Laterality: Left;     Family History    Problem Relation Name Age of Onset    Colon cancer Mother      No Known Problems Father       Social History     Tobacco Use    Smoking status: Every Day     Current packs/day: 1.00     Average packs/day: 1 pack/day for 20.6 years (20.6 ttl pk-yrs)     Types: Cigarettes     Start date: 1/1/2004    Smokeless tobacco: Never    Tobacco comments:     Patient declined nicotine patches   Substance Use Topics    Alcohol use: Not Currently    Drug use: Not Currently     Comment: currently in a [employer] program, not using that anymore     Review of Systems   Constitutional:  Negative for fever.   HENT:  Negative for sore throat.    Respiratory:  Positive for chest tightness, shortness of breath and wheezing.    Cardiovascular:  Negative for chest pain.   Gastrointestinal:  Negative for nausea and vomiting.   Genitourinary:  Negative for dysuria.   Musculoskeletal:  Negative for back pain.   Skin:  Negative for rash.   Neurological:  Negative for weakness.   Hematological:  Does not bruise/bleed easily.       Physical Exam     Initial Vitals [08/21/24 1541]   BP Pulse Resp Temp SpO2   139/84 96 20 98.4 °F (36.9 °C) (!) 89 %      MAP       --         Physical Exam    Nursing note and vitals reviewed.  Constitutional: He appears well-developed and well-nourished. He is not diaphoretic. He appears distressed.   Positive conversational dyspnea with accessory muscle use.   HENT:   Head: Normocephalic and atraumatic.   Nose: Nose normal.   Mouth/Throat: Oropharynx is clear and moist.   Eyes: Conjunctivae and EOM are normal. Pupils are equal, round, and reactive to light. No scleral icterus.   Neck: Neck supple.   Normal range of motion.  Cardiovascular:  Normal rate, regular rhythm, normal heart sounds and intact distal pulses.     Exam reveals no gallop and no friction rub.       No murmur heard.  Pulmonary/Chest: No stridor. He is in respiratory distress. He has wheezes. He exhibits no tenderness.   Course breath sounds  throughout with diffuse wheezing throughout all lung fields.     Abdominal: Abdomen is soft. Bowel sounds are normal. He exhibits no mass. There is no abdominal tenderness. There is no rebound and no guarding.   Musculoskeletal:         General: No edema. Normal range of motion.      Cervical back: Normal range of motion and neck supple.     Lymphadenopathy:     He has no cervical adenopathy.   Neurological: He is alert and oriented to person, place, and time. He has normal strength and normal reflexes. No cranial nerve deficit or sensory deficit. GCS score is 15. GCS eye subscore is 4. GCS verbal subscore is 5. GCS motor subscore is 6.   Skin: Skin is warm and dry. Capillary refill takes less than 2 seconds. No rash noted.   Psychiatric: He has a normal mood and affect. His behavior is normal. Judgment and thought content normal.         ED Course   Procedures  Labs Reviewed   CBC W/ AUTO DIFFERENTIAL - Abnormal       Result Value    WBC 4.04      RBC 5.25      Hemoglobin 12.6 (*)     Hematocrit 39.8 (*)     MCV 76 (*)     MCH 24.0 (*)     MCHC 31.7 (*)     RDW 17.1 (*)     Platelets 203      MPV 10.8      Immature Granulocytes 0.2      Gran # (ANC) 2.6      Immature Grans (Abs) 0.01      Lymph # 0.8 (*)     Mono # 0.4      Eos # 0.3      Baso # 0.03      nRBC 0      Gran % 64.9      Lymph % 19.1      Mono % 8.7      Eosinophil % 6.4      Basophil % 0.7      Differential Method Automated     COMPREHENSIVE METABOLIC PANEL - Abnormal    Sodium 141      Potassium 3.8      Chloride 108      CO2 27      Glucose 139 (*)     BUN 17      Creatinine 1.0      Calcium 8.3 (*)     Total Protein 6.4      Albumin 3.9      Total Bilirubin 0.3      Alkaline Phosphatase 38 (*)     AST 23      ALT 15      eGFR >60.0      Anion Gap 6 (*)    ISTAT PROCEDURE - Abnormal    POC PH 7.327 (*)     POC PCO2 51.1 (*)     POC PO2 110 (*)     POC HCO3 26.7      POC BE 1      POC SATURATED O2 98      POC Glucose 131 (*)     POC Sodium 142       POC Potassium 3.5      POC TCO2 28 (*)     POC Ionized Calcium 1.23      POC Hematocrit 41      Sample ARTERIAL      Site RR      Allens Test Pass      DelSys Nasal Can      Mode SPONT      Flow 3     B-TYPE NATRIURETIC PEPTIDE    BNP 26     SARS-COV-2 RNA AMPLIFICATION, QUAL    SARS-CoV-2 RNA, Amplification, Qual Negative     INFLUENZA A AND B ANTIGEN    Influenza A, Molecular Negative      Influenza B, Molecular Negative      Flu A & B Source Nasal swab      Narrative:     Specimen Source->Nasopharyngeal Swab   MAGNESIUM    Magnesium 2.6     TROPONIN I HIGH SENSITIVITY    Troponin I High Sensitivity 3.8     TROPONIN I HIGH SENSITIVITY   DRUG SCREEN PANEL, URINE EMERGENCY        ECG Results              EKG 12-lead (In process)        Collection Time Result Time QRS Duration OHS QTC Calculation    08/21/24 15:53:45 08/21/24 15:58:51 92 394                     In process by Interface, Lab In [facility] (08/21/24 15:59:01)                   Narrative:    Test Reason : R07.9,    Vent. Rate : 087 BPM     Atrial Rate : 087 BPM     P-R Int : 138 ms          QRS Dur : 092 ms      QT Int : 328 ms       P-R-T Axes : 082 088 082 degrees     QTc Int : 394 ms    Normal sinus rhythm  Normal ECG  When compared with ECG of 24-JUL-2024 05:54,  No significant change was found    Referred By: [email or web address]   SELF           Confirmed By:                                   Imaging Results              X-Ray Chest AP Portable (Final result)  Result time 08/21/24 17:43:34      Final result by Felix Maxwell MD (08/21/24 17:43:34)                   Impression:      No acute cardiopulmonary process identified.      Electronically signed by: Felix Maxwell  Date:    08/21/2024  Time:    17:43               Narrative:    EXAMINATION:  XR CHEST AP PORTABLE    CLINICAL HISTORY:  Chest Pain;    TECHNIQUE:  Single frontal view of the chest was performed.    COMPARISON:  Chest x-ray 07/24/2024.    FINDINGS:  Cardiomediastinal silhouette is within normal  limits.  Lungs are well expanded and overall clear.  No focal consolidation, pneumothorax, or pleural effusion.  Monitoring leads/tubing project over the bilateral chest.  No acute bony process identified.                                       Medications   levalbuterol nebulizer solution 1.25 mg (has no administration in time range)   methylPREDNISolone sodium succinate injection 125 mg (125 mg Intravenous Given 8/21/24 1705)   magnesium sulfate 2g in water 50mL IVPB (premix) (0 g Intravenous Stopped 8/21/24 1906)   levalbuterol nebulizer solution 3.75 mg (3.75 mg Nebulization Given 8/21/24 1656)     Medical Decision Making  Amount and/or Complexity of Data Reviewed  Labs: ordered.  Radiology: ordered.    Risk  Prescription drug management.  Decision regarding hospitalization.              Attending Attestation:         Attending Critical Care:   Critical Care Times:   Direct Patient Care (initial evaluation, reassessments, and time considering the case)................................................................30 minutes.   Additional History from reviewing old medical records or taking additional history from the family, EMS, PCP, etc.......................5 minutes.   Ordering, Reviewing, and Interpreting Diagnostic Studies...............................................................................................................5 minutes.   Documentation..................................................................................................................................................................................5 minutes.   Consultation with other Physicians. .................................................................................................................................................5 minutes.   Consultation with the patient's family directly relating to the patient's condition, care, and DNR status (when patient unable)......5 minutes.    Other..................................................................................................................................................................................................5 minutes.   ==============================================================  Total Critical Care Time - exclusive of procedural time: 60 minutes.  ==============================================================  Critical care was necessary to treat or prevent imminent or life-threatening deterioration of the following conditions: COPD exacerbation.   Critical care was time spent personally by me on the following activities: obtaining history from patient or relative, examination of patient, review of x-rays / CT sent with the patient, review of old charts, ordering lab, x-rays, and/or EKG, development of treatment plan with patient or relative, evaluation of patient's response to treatment, ordering and performing treatments and interventions, discussion with consultants and re-evaluation of patient's conition.   Critical Care Condition: potentially life-threatening                      Medical Decision Making:   Initial Assessment:   59-year-old male who has had a prior history of GERD, drug addiction, depression, type 2 diabetes, HIV, psychiatric history, presents with complaints of shortness of breath of over last 24 hours.  Patient attributes his most recent episode of shortness of breath to smoking cigarettes yesterday.  Since states has had chest tightness, wheezing, shortness of breath.  Patient states symptoms are worsened with walking.  Patient also endorses being out of his home albuterol HFA medications.   Patient does admit that he was a former moderate to heavy smoker.  Smoked approximally 3/4 of pack cigarettes daily.  He has had some cough which is f nonproductive.  Denies fever, no URI symptoms.  He has wheezing.  No significant chest pain.  No complaint of any abdominal pain nausea vomiting.  No  palpitations.  No history of CHF.  Upon patient arrival to the emergency department patient found with room air sats of 89-90%.  Denies home oxygen use.      Differential Diagnosis:   COPD exacerbation, pneumonia, congestive heart failure, viral syndrome, URI  Clinical Tests:   Lab Tests: Ordered and Reviewed  Radiological Study: Ordered and Reviewed  Medical Tests: Ordered and Reviewed             Clinical Impression:  Final diagnoses:  [J44.1] COPD exacerbation (Primary)  [R09.02] Hypoxia  [Z72.0] Tobacco use          ED Disposition Condition    Admit Stable                Cristian Wiggins MD  08/21/24 [id number]       Cristian Wiggins MD  08/21/24 1408

## 2024-08-21 NOTE — Clinical Note
Diagnosis: COPD exacerbation [460873]   Admit to which facility:: [facility] [[id number]]   Reason for IP Medical Treatment  (Clinical interventions that can only be accomplished in the IP setting? ) :: COPD Exacerbation

## 2024-08-22 PROBLEM — F41.9 ANXIETY: Status: ACTIVE | Noted: 2024-08-22

## 2024-08-22 PROBLEM — J44.1 COPD EXACERBATION: Status: ACTIVE | Noted: 2024-08-22

## 2024-08-22 LAB
ALBUMIN SERPL BCP-MCNC: 4 G/DL (ref 3.5–5.2)
ALP SERPL-CCNC: 41 U/L (ref 55–135)
ALT SERPL W/O P-5'-P-CCNC: 15 U/L (ref 10–44)
ANION GAP SERPL CALC-SCNC: 8 MMOL/L (ref 8–16)
AST SERPL-CCNC: 20 U/L (ref 10–40)
BASOPHILS # BLD AUTO: 0.01 K/UL (ref 0–0.2)
BASOPHILS NFR BLD: 0.2 % (ref 0–1.9)
BILIRUB SERPL-MCNC: 0.3 MG/DL (ref 0.1–1)
BUN SERPL-MCNC: 19 MG/DL (ref 6–20)
CALCIUM SERPL-MCNC: 8.6 MG/DL (ref 8.7–10.5)
CHLORIDE SERPL-SCNC: 107 MMOL/L (ref 95–110)
CO2 SERPL-SCNC: 25 MMOL/L (ref 23–29)
CREAT SERPL-MCNC: 0.8 MG/DL (ref 0.5–1.4)
DIFFERENTIAL METHOD BLD: ABNORMAL
EOSINOPHIL # BLD AUTO: 0 K/UL (ref 0–0.5)
EOSINOPHIL NFR BLD: 0 % (ref 0–8)
ERYTHROCYTE [DISTWIDTH] IN BLOOD BY AUTOMATED COUNT: 16.6 % (ref 11.5–14.5)
EST. GFR  (NO RACE VARIABLE): >60 ML/MIN/1.73 M^2
ESTIMATED AVG GLUCOSE: 134 MG/DL (ref 68–131)
GLUCOSE SERPL-MCNC: 126 MG/DL (ref 70–110)
GLUCOSE SERPL-MCNC: 146 MG/DL (ref 70–110)
GLUCOSE SERPL-MCNC: 168 MG/DL (ref 70–110)
HBA1C MFR BLD: 6.3 % (ref 4.5–6.2)
HCT VFR BLD AUTO: 36.8 % (ref 40–54)
HGB BLD-MCNC: 12 G/DL (ref 14–18)
IMM GRANULOCYTES # BLD AUTO: 0.04 K/UL (ref 0–0.04)
IMM GRANULOCYTES NFR BLD AUTO: 0.8 % (ref 0–0.5)
LYMPHOCYTES # BLD AUTO: 0.4 K/UL (ref 1–4.8)
LYMPHOCYTES NFR BLD: 9.1 % (ref 18–48)
MAGNESIUM SERPL-MCNC: 2.1 MG/DL (ref 1.6–2.6)
MCH RBC QN AUTO: 24.5 PG (ref 27–31)
MCHC RBC AUTO-ENTMCNC: 32.6 G/DL (ref 32–36)
MCV RBC AUTO: 75 FL (ref 82–98)
MONOCYTES # BLD AUTO: 0.1 K/UL (ref 0.3–1)
MONOCYTES NFR BLD: 2.9 % (ref 4–15)
NEUTROPHILS # BLD AUTO: 4.2 K/UL (ref 1.8–7.7)
NEUTROPHILS NFR BLD: 87 % (ref 38–73)
NRBC BLD-RTO: 0 /100 WBC
PHOSPHATE SERPL-MCNC: 3.2 MG/DL (ref 2.7–4.5)
PLATELET # BLD AUTO: 209 K/UL (ref 150–450)
PMV BLD AUTO: 11.4 FL (ref 9.2–12.9)
POTASSIUM SERPL-SCNC: 4 MMOL/L (ref 3.5–5.1)
PROT SERPL-MCNC: 6.4 G/DL (ref 6–8.4)
RBC # BLD AUTO: 4.89 M/UL (ref 4.6–6.2)
SODIUM SERPL-SCNC: 140 MMOL/L (ref 136–145)
WBC # BLD AUTO: 4.85 K/UL (ref 3.9–12.7)

## 2024-08-22 PROCEDURE — 99900031 HC PATIENT EDUCATION (STAT)

## 2024-08-22 PROCEDURE — 83036 HEMOGLOBIN GLYCOSYLATED A1C: CPT | Performed by: STUDENT IN AN ORGANIZED HEALTH CARE EDUCATION/TRAINING PROGRAM

## 2024-08-22 PROCEDURE — 99900035 HC TECH TIME PER 15 MIN (STAT)

## 2024-08-22 PROCEDURE — 36415 COLL VENOUS BLD VENIPUNCTURE: CPT | Performed by: STUDENT IN AN ORGANIZED HEALTH CARE EDUCATION/TRAINING PROGRAM

## 2024-08-22 PROCEDURE — 80053 COMPREHEN METABOLIC PANEL: CPT | Performed by: STUDENT IN AN ORGANIZED HEALTH CARE EDUCATION/TRAINING PROGRAM

## 2024-08-22 PROCEDURE — 25000242 PHARM REV CODE 250 ALT 637 W/ HCPCS: Performed by: STUDENT IN AN ORGANIZED HEALTH CARE EDUCATION/TRAINING PROGRAM

## 2024-08-22 PROCEDURE — 27000221 HC OXYGEN, UP TO 24 HOURS

## 2024-08-22 PROCEDURE — 94761 N-INVAS EAR/PLS OXIMETRY MLT: CPT

## 2024-08-22 PROCEDURE — 63600175 PHARM REV CODE 636 W HCPCS: Performed by: STUDENT IN AN ORGANIZED HEALTH CARE EDUCATION/TRAINING PROGRAM

## 2024-08-22 PROCEDURE — 85025 COMPLETE CBC W/AUTO DIFF WBC: CPT | Performed by: STUDENT IN AN ORGANIZED HEALTH CARE EDUCATION/TRAINING PROGRAM

## 2024-08-22 PROCEDURE — 84100 ASSAY OF PHOSPHORUS: CPT | Performed by: STUDENT IN AN ORGANIZED HEALTH CARE EDUCATION/TRAINING PROGRAM

## 2024-08-22 PROCEDURE — 63700000 PHARM REV CODE 250 ALT 637 W/O HCPCS: Performed by: STUDENT IN AN ORGANIZED HEALTH CARE EDUCATION/TRAINING PROGRAM

## 2024-08-22 PROCEDURE — 25000003 PHARM REV CODE 250: Performed by: STUDENT IN AN ORGANIZED HEALTH CARE EDUCATION/TRAINING PROGRAM

## 2024-08-22 PROCEDURE — 94640 AIRWAY INHALATION TREATMENT: CPT

## 2024-08-22 PROCEDURE — 83735 ASSAY OF MAGNESIUM: CPT | Performed by: STUDENT IN AN ORGANIZED HEALTH CARE EDUCATION/TRAINING PROGRAM

## 2024-08-22 PROCEDURE — 94640 AIRWAY INHALATION TREATMENT: CPT | Mod: XB

## 2024-08-22 PROCEDURE — 12000002 HC ACUTE/MED SURGE SEMI-PRIVATE ROOM

## 2024-08-22 PROCEDURE — G0378 HOSPITAL OBSERVATION PER HR: HCPCS

## 2024-08-22 RX ORDER — IPRATROPIUM BROMIDE AND ALBUTEROL SULFATE 2.5; .5 MG/3ML; MG/3ML
3 SOLUTION RESPIRATORY (INHALATION) EVERY 4 HOURS PRN
Status: DISCONTINUED | OUTPATIENT
Start: 2024-08-22 | End: 2024-08-23 | Stop reason: HOSPADM

## 2024-08-22 RX ORDER — GLUCAGON 1 MG
1 KIT INJECTION
Status: DISCONTINUED | OUTPATIENT
Start: 2024-08-22 | End: 2024-08-22

## 2024-08-22 RX ORDER — LORAZEPAM 0.5 MG/1
1 TABLET ORAL EVERY 8 HOURS PRN
Status: DISCONTINUED | OUTPATIENT
Start: 2024-08-22 | End: 2024-08-23 | Stop reason: HOSPADM

## 2024-08-22 RX ORDER — IBUPROFEN 200 MG
16 TABLET ORAL
Status: DISCONTINUED | OUTPATIENT
Start: 2024-08-22 | End: 2024-08-22

## 2024-08-22 RX ORDER — INSULIN ASPART 100 [IU]/ML
0-10 INJECTION, SOLUTION INTRAVENOUS; SUBCUTANEOUS
Status: DISCONTINUED | OUTPATIENT
Start: 2024-08-22 | End: 2024-08-23 | Stop reason: HOSPADM

## 2024-08-22 RX ORDER — IBUPROFEN 200 MG
24 TABLET ORAL
Status: DISCONTINUED | OUTPATIENT
Start: 2024-08-22 | End: 2024-08-22

## 2024-08-22 RX ADMIN — PREDNISONE 40 MG: 20 TABLET ORAL at 10:08

## 2024-08-22 RX ADMIN — PREGABALIN 75 MG: 75 CAPSULE ORAL at 10:08

## 2024-08-22 RX ADMIN — LORAZEPAM 1 MG: 0.5 TABLET ORAL at 02:08

## 2024-08-22 RX ADMIN — ATORVASTATIN CALCIUM 10 MG: 10 TABLET, FILM COATED ORAL at 09:08

## 2024-08-22 RX ADMIN — ACETAMINOPHEN 650 MG: 325 TABLET ORAL at 06:08

## 2024-08-22 RX ADMIN — IPRATROPIUM BROMIDE AND ALBUTEROL SULFATE 3 ML: 2.5; .5 SOLUTION RESPIRATORY (INHALATION) at 03:08

## 2024-08-22 RX ADMIN — IPRATROPIUM BROMIDE AND ALBUTEROL SULFATE 3 ML: 2.5; .5 SOLUTION RESPIRATORY (INHALATION) at 11:08

## 2024-08-22 RX ADMIN — SULFAMETHOXAZOLE AND TRIMETHOPRIM 1 TABLET: 800; 160 TABLET ORAL at 10:08

## 2024-08-22 RX ADMIN — LORAZEPAM 1 MG: 0.5 TABLET ORAL at 09:08

## 2024-08-22 RX ADMIN — IPRATROPIUM BROMIDE AND ALBUTEROL SULFATE 3 ML: 2.5; .5 SOLUTION RESPIRATORY (INHALATION) at 02:08

## 2024-08-22 RX ADMIN — BICTEGRAVIR SODIUM, EMTRICITABINE, AND TENOFOVIR ALAFENAMIDE FUMARATE 1 TABLET: 50; 200; 25 TABLET ORAL at 10:08

## 2024-08-22 RX ADMIN — TIZANIDINE 4 MG: 4 TABLET ORAL at 09:08

## 2024-08-22 RX ADMIN — IPRATROPIUM BROMIDE AND ALBUTEROL SULFATE 3 ML: 2.5; .5 SOLUTION RESPIRATORY (INHALATION) at 08:08

## 2024-08-22 RX ADMIN — PREGABALIN 75 MG: 75 CAPSULE ORAL at 09:08

## 2024-08-22 RX ADMIN — IPRATROPIUM BROMIDE AND ALBUTEROL SULFATE 3 ML: 2.5; .5 SOLUTION RESPIRATORY (INHALATION) at 07:08

## 2024-08-22 RX ADMIN — AZITHROMYCIN DIHYDRATE 250 MG: 250 TABLET ORAL at 09:08

## 2024-08-22 RX ADMIN — SULFAMETHOXAZOLE AND TRIMETHOPRIM 1 TABLET: 800; 160 TABLET ORAL at 09:08

## 2024-08-22 RX ADMIN — PRAZOSIN HYDROCHLORIDE 1 MG: 1 CAPSULE ORAL at 09:08

## 2024-08-22 NOTE — CARE UPDATE
08/21/24 2043   Patient Assessment/Suction   Level of Consciousness (AVPU) alert   Respiratory Effort Unlabored;Short of breath   Expansion/Accessory Muscles/Retractions no use of accessory muscles   All Lung Fields Breath Sounds Anterior:;diminished;wheezes, expiratory;wheezes, inspiratory   Rhythm/Pattern, Respiratory depth regular;pattern regular   Cough Frequency infrequent   Cough Type nonproductive   PRE-TX-O2   Device (Oxygen Therapy) nasal cannula   $ Is the patient on Low Flow Oxygen? Yes   Flow (L/min) (Oxygen Therapy) 3   SpO2 (!) 94 %   Pulse Oximetry Type Continuous   $ Pulse Oximetry - Multiple Charge Pulse Oximetry - Multiple   Pulse 95   Resp (!) 22   Aerosol Therapy   $ Aerosol Therapy Charges Aerosol Treatment   Daily Review of Necessity (SVN) completed   Respiratory Treatment Status (SVN) given   Treatment Route (SVN) mask   Patient Position HOB elevated   Post Treatment Assessment (SVN) increased aeration   Signs of Intolerance (SVN) none   Education   $ Education Bronchodilator;Oxygen;15 min   Tobacco Cessation Intervention   Do you use any type of tobacco product? No   Are you interested in quitting use of tobacco products? Not interested   Are you interested in Nicotine Replacement for symptom relief? No   Respiratory Evaluation   $ Care Plan Tech Time 15 min   $ Respiratory Evaluation Complete   Evaluation For New Orders   Admitting Diagnosis SHORTNESS OF BREATH   Home Oxygen   Has Home Oxygen? No   Home Aerosol, MDI, DPI, and Other Treatments/Therapies   Home Respiratory Therapy Per Patient/Review of Chart Yes   MDI Home Meds/Freq albuterol (PROVENTIL/VENTOLIN HFA) 90 mcg/actuation inhaler   Oxygen Care Plan   Oxygen Care Plan Per Protocol   SPO2 Goal (%) 92% non-cardiac   Rationale SpO2 is <92% (Non-cardiac Pt.)   Bronchodilator Care Plan   Bronchodilator Care Plan MDI   MDI Meds w/ frequency   (albuterol (PROVENTIL/VENTOLIN HFA) 90 mcg/actuation inhaler)   Rationale Maintain home  respiratory medicine   Atelectasis Care Plan   Rationale No Rational Found   Airway Clearance Care Plan   Rationale No rationale found

## 2024-08-22 NOTE — H&P
[facility]  History & Physical    Patient Name: Vinicius Atkinson  MRN: [id number]  Patient Class: OP- Observation  Admission Date: 8/21/2024  Attending Physician: Edgar Tinoco MD  Primary Care Provider: [facility]         Patient information was obtained from patient and ER records.     Subjective:     Principal Problem:<principal problem not specified>    Chief Complaint:   Chief Complaint   Patient presents with    Shortness of Breath     Pt stated that he feels like he can barely breathe. Pt stated that he smoked cigarettes yesterday and it made it worse.        HPI: Patient is a 60yo with a PMH of COPD, smoking & HTN presented to the ED for Hypoxia. Patient states that he ran out of inhalers and he was smoking. Patient began having wheezing, phlegmy cough & dyspnea. This similar to prior COPD Excerebration Patient had. For this reason Patient came into the ED where he got IV Solumedrol and Albuterol    Patient was interviewed, he states that he still has dyspnea and he also has some anxiety. He denies fever    Past Medical History:   Diagnosis Date    Acid reflux     Addiction to drug     Anxiety     Depression     Diabetes mellitus, type 2     History of psychiatric hospitalization     HIV infection     Hx of psychiatric care     Tori     Psychiatric exam requested by authority     Psychiatric problem     Substance abuse     Suicide attempt     Therapy        Past Surgical History:   Procedure Laterality Date    LEG SURGERY      ORIF MANDIBULAR FRACTURE Left 9/9/2022    Procedure: ORIF, FRACTURE, MANDIBLE Reviewed by ROHITH 09/06/22 1201;  Surgeon: Trenton Drew DDS, MD;  Location: [facility] MAIN OR;  Service: Oral Surgery;  Laterality: Left;       Review of patient's allergies indicates:   Allergen Reactions    Tramadol Diarrhea       Current Facility-Administered Medications on File Prior to Encounter   Medication    albuterol-ipratropium 2.5 mg-0.5 mg/3  mL nebulizer solution 3 mL    sodium chloride 0.9% flush 3 mL     Current Outpatient Medications on File Prior to Encounter   Medication Sig    albuterol (PROVENTIL/VENTOLIN HFA) 90 mcg/actuation inhaler Inhale 1-2 puffs into the lungs every 6 (six) hours as needed for Wheezing. Rescue    atorvastatin (LIPITOR) 10 MG tablet Take 10 mg by mouth once daily.    kieuahnbx-hpoxplhh-ooyvbdm ala (BIKTARVY) -25 mg (25 kg or greater) Take 1 tablet by mouth once daily.    clotrimazole (LOTRIMIN) 1 % cream Apply 1 application  topically 2 (two) times daily.    ibuprofen (ADVIL,MOTRIN) 800 MG tablet Take 800 mg by mouth every 8 (eight) hours as needed for Pain.    metFORMIN (GLUCOPHAGE) 500 MG tablet Take 500 mg by mouth once daily.    prazosin (MINIPRESS) 1 MG Cap Take 1 mg by mouth every evening.    pregabalin (LYRICA) 75 MG capsule Take 75 mg by mouth 2 (two) times daily.    tiZANidine (ZANAFLEX) 4 MG tablet Take 4 mg by mouth nightly as needed.    VIVITROL 380 mg SSRR kit SMARTSI Each IM Once a Month    [DISCONTINUED] FLUoxetine 20 MG capsule Take 1 capsule (20 mg total) by mouth once daily.    [DISCONTINUED] omeprazole (PRILOSEC) 20 MG capsule Take 20 mg by mouth once daily.    [DISCONTINUED] OXcarbazepine (TRILEPTAL) 150 MG Tab Take 1 tablet (150 mg total) by mouth 2 (two) times daily.    [DISCONTINUED] pantoprazole (PROTONIX) 40 MG tablet Take 1 tablet (40 mg total) by mouth before breakfast.    [DISCONTINUED] traZODone (DESYREL) 50 MG tablet Take 1 tablet (50 mg total) by mouth nightly as needed for Insomnia.     Family History       Problem Relation (Age of Onset)    Colon cancer Mother    No Known Problems Father          Tobacco Use    Smoking status: Every Day     Current packs/day: 1.00     Average packs/day: 1 pack/day for 20.6 years (20.6 ttl pk-yrs)     Types: Cigarettes     Start date: 2004    Smokeless tobacco: Never    Tobacco comments:     Patient declined nicotine patches   Substance and Sexual  Activity    Alcohol use: Not Currently    Drug use: Not Currently     Comment: currently in a [employer], not using that anymore    Sexual activity: Not Currently     Review of Systems   Constitutional:  Negative for activity change, appetite change, fatigue and fever.   HENT:  Negative for rhinorrhea, sinus pressure and sinus pain.    Eyes:  Negative for photophobia, redness and visual disturbance.   Respiratory:  Positive for cough, chest tightness, shortness of breath and wheezing. Negative for choking.    Gastrointestinal:  Negative for abdominal pain, diarrhea and vomiting.   Endocrine: Negative for polydipsia, polyphagia and polyuria.   Genitourinary:  Negative for difficulty urinating, dysuria and hematuria.   Musculoskeletal:  Negative for arthralgias, back pain and joint swelling.   Skin:  Negative for pallor, rash and wound.   Neurological:  Negative for dizziness, seizures and numbness.   Psychiatric/Behavioral:  Positive for agitation. Negative for sleep disturbance. The patient is nervous/anxious.      Objective:     Vital Signs (Most Recent):  Temp: 97.3 °F (36.3 °C) (08/22/24 0415)  Pulse: 104 (08/22/24 0415)  Resp: 19 (08/22/24 0415)  BP: 109/77 (08/22/24 0415)  SpO2: (!) 93 % (08/22/24 0415) Vital Signs (24h Range):  Temp:  [97.3 °F (36.3 °C)-98.4 °F (36.9 °C)] 97.3 °F (36.3 °C)  Pulse:  [] 104  Resp:  [17-33] 19  SpO2:  [89 %-100 %] 93 %  BP: (109-154)/(77-92) 109/77     Weight: 79.4 kg (175 lb)  Body mass index is 23.73 kg/m².     Physical Exam  HENT:      Head: Normocephalic and atraumatic.      Right Ear: External ear normal.      Left Ear: External ear normal.      Nose: No congestion or rhinorrhea.   Cardiovascular:      Rate and Rhythm: Normal rate and regular rhythm.   Pulmonary:      Effort: No respiratory distress.      Breath sounds: No wheezing or rales.   Abdominal:      General: There is no distension.      Palpations: There is no mass.      Tenderness: There is no  abdominal tenderness. There is no guarding or rebound.      Hernia: No hernia is present.   Musculoskeletal:         General: No swelling or deformity.      Right lower leg: No edema.   Skin:     Findings: No bruising or lesion.   Neurological:      Mental Status: He is alert.      Motor: No weakness.                Significant Labs: All pertinent labs within the past 24 hours have been reviewed.  ABGs:   Recent Labs   Lab 08/21/24  1703   PH 7.327*   PCO2 51.1*   HCO3 26.7   POCSATURATED 98   BE 1   PO2 110*     BMP:   Recent Labs   Lab 08/21/24  1735   *      K 3.8      CO2 27   BUN 17   CREATININE 1.0   CALCIUM 8.3*   MG 2.6     CBC:   Recent Labs   Lab 08/21/24  1641 08/21/24  1703   WBC 4.04  --    HGB 12.6*  --    HCT 39.8* 41     --      CMP:   Recent Labs   Lab 08/21/24  1735      K 3.8      CO2 27   *   BUN 17   CREATININE 1.0   CALCIUM 8.3*   PROT 6.4   ALBUMIN 3.9   BILITOT 0.3   ALKPHOS 38*   AST 23   ALT 15   ANIONGAP 6*       Significant Imaging: I have reviewed all pertinent imaging results/findings within the past 24 hours.  Assessment/Plan:     Anxiety  Ativan PRN Ordered       COPD exacerbation  Patient's COPD is with exacerbation noted by worsening of baseline hypoxia currently.  Patient is currently on COPD Pathway. Continue scheduled inhalers Steroids, Antibiotics, and Supplemental oxygen and monitor respiratory status closely.     HIV infection  Home Prophylactic & Suppressive Antiviral Therapy       Type 2 diabetes mellitus, without long-term current use of insulin  SSI Started        VTE Risk Mitigation (From admission, onward)           Ordered     IP VTE LOW RISK PATIENT  Once         08/21/24 1959     Place sequential compression device  Until discontinued         08/21/24 1959                       On 08/22/2024, patient should be placed in hospital observation services under my care.             Edgar Tinoco MD  Department of Hospital  Medicine  [facility]

## 2024-08-22 NOTE — PLAN OF CARE
Problem: Adult Inpatient Plan of Care  Goal: Plan of Care Review  Outcome: Progressing  Goal: Patient-Specific Goal (Individualized)  Outcome: Progressing  Goal: Absence of Hospital-Acquired Illness or Injury  Outcome: Progressing  Goal: Optimal Comfort and Wellbeing  Outcome: Progressing  Goal: Readiness for Transition of Care  Outcome: Progressing     Problem: COPD (Chronic Obstructive Pulmonary Disease)  Goal: Optimal Chronic Illness Coping  Outcome: Progressing  Goal: Optimal Level of Functional [place]  Outcome: Progressing  Goal: Absence of Infection Signs and Symptoms  Outcome: Progressing  Goal: Improved Oral Intake  Outcome: Progressing  Goal: Effective Oxygenation and Ventilation  Outcome: Progressing     Problem: Diabetes Comorbidity  Goal: Blood Glucose Level Within Targeted Range  Outcome: Progressing

## 2024-08-22 NOTE — ASSESSMENT & PLAN NOTE
Patient's COPD is with exacerbation noted by worsening of baseline hypoxia currently.  Patient is currently on COPD Pathway. Continue scheduled inhalers Steroids, Antibiotics, and Supplemental oxygen and monitor respiratory status closely.

## 2024-08-22 NOTE — NURSING
Nurses Note -- 4 Eyes      8/22/2024   2:48 AM      Skin assessed during: Admit      [x] No Altered Skin Integrity Present    []Prevention Measures Documented      [] Yes- Altered Skin Integrity Present or Discovered   [] LDA Added if Not in Epic (Describe Wound)   [] New Altered Skin Integrity was Present on Admit and Documented in LDA   [] Wound Image Taken    Wound Care Consulted? No    Attending Nurse:  Eloisa Gallardo RN/Staff Member:   Sophie

## 2024-08-22 NOTE — CARE UPDATE
Patient was seen and examined at bedside this morning.  He is doing well today, but he does have persistent shortness of breath with cough.  He is currently on 3 L O2 via nasal cannula.  Continuing Bactrim, azithromycin, Biktarvy, prednisone, DuoNebs.  Patient was counseled on smoking cessation.  He states he has never formally been diagnosed with COPD, and would benefit from follow up with pulmonologist at discharge.

## 2024-08-22 NOTE — PLAN OF CARE
[facility]  Initial Discharge Assessment       Primary Care Provider: [facility]    Admission Diagnosis: COPD exacerbation [J44.1]    Admission Date: 8/21/2024  Expected Discharge Date: 8/24/2024      met with Pt at bedside to complete discharge assessment. Pt AAOx4s. Demographics, PCP, and insurance verified. No home health. No dialysis. Pt reports ability to complete ADLs without assistance. Pt verbalized plan to discharge back to [facility] . Pt has no other needs to be addressed at this time.    Transition of Care Barriers: None    Payor: MEDICAID / Plan: [employer] / Product Type: Managed Medicaid /     Extended Emergency Contact Information  Primary Emergency Contact: PandaHowie  Mobile Phone: [phone]  Relation: Sister  Preferred language: English   needed? No    Discharge Plan A: Group Home  Discharge Plan B: Group home      [id number] [address] AT [facility]  [address]  [facility] [address]  Phone: [phone] Fax: [phone]    [facility][id number] [place], [place] - [address] AT [facility] OF [facility] &  [address]  [address]  [facility] [address]  Phone: [phone] Fax: [phone]    [place], [place] - [address]  [address] [facility] [address]  Phone: [phone] Fax: [phone]    [facility][id number] [place], [place] - [address] AT [facility] [address]  [address]  [place] [address]  Phone: [phone] Fax: [phone]    [facility] [address] [place] [address] - [address]  [address]  [place] [address]  Phone: [phone] Fax: [phone]      Initial Assessment (most recent)       Adult Discharge Assessment - 08/22/24 1420          Discharge Assessment    Assessment Type Discharge Planning  Assessment     Confirmed/corrected address, phone number and insurance Yes     Confirmed Demographics Correct on Facesheet     Source of Information patient     When was your last doctors appointment? --   a few months ago    Communicated ZORAIDA with patient/caregiver Date not available/Unable to determine     Reason For Admission COPD exacerbation     People in Home --   [facility]    Do you expect to return to your current living situation? Yes     Do you have help at home or someone to help you manage your care at home? Yes     Prior to hospitilization cognitive status: Alert/Oriented     Current cognitive status: Alert/Oriented     Walking or Climbing Stairs Difficulty yes     Walking or Climbing Stairs ambulation difficulty, requires equipment;stair climbing difficulty, requires equipment     Mobility Management cane     Dressing/Bathing Difficulty no     Home Layout Able to live on 1st floor     Equipment Currently Used at Home cane, straight     Readmission within 30 days? No     Patient currently being followed by outpatient case management? No     Do you currently have service(s) that help you manage your care at home? No     Do you take prescription medications? Yes     Do you have prescription coverage? Yes     Coverage Payor: MEDICAID - [employer] -     Do you have any problems affording any of your prescribed medications? No     Is the patient taking medications as prescribed? yes     How do you get to doctors appointments? health plan transportation;agency     Are you on dialysis? No     Do you take coumadin? No     Discharge Plan A Group Home     Discharge Plan B Group home     Discharge Plan discussed with: Patient     Transition of Care Barriers None        Physical Activity    On average, how many days per week do you engage in moderate to strenuous exercise (like a brisk walk)? 4 days     On average, how many minutes do you engage in exercise at this level? 20 min        Financial  Resource Strain    How hard is it for you to pay for the very basics like food, housing, medical care, and heating? Not hard at all        Housing Stability    In the last 12 months, was there a time when you were not able to pay the mortgage or rent on time? No     At any time in the past 12 months, were you homeless or living in a shelter (including now)? No        Transportation Needs    Has the lack of transportation kept you from medical appointments, meetings, work or from getting things needed for daily living? No        Food Insecurity    Within the past 12 months, you worried that your food would run out before you got the money to buy more. Never true     Within the past 12 months, the food you bought just didn't last and you didn't have money to get more. Never true        Stress    Do you feel stress - tense, restless, nervous, or anxious, or unable to sleep at night because your mind is troubled all the time - these days? To some extent        Social Isolation    How often do you feel lonely or isolated from those around you?  Never        Utilities    In the past 12 months has the electric, gas, oil, or water company threatened to shut off services in your home? No        Health Literacy    How often do you need to have someone help you when you read instructions, pamphlets, or other written material from your doctor or pharmacy? Never

## 2024-08-22 NOTE — HPI
Patient is a 58yo with a PMH of COPD, smoking & HTN presented to the ED for Hypoxia. Patient states that he ran out of inhalers and he was smoking. Patient began having wheezing, phlegmy cough & dyspnea. This similar to prior COPD Excerebration Patient had. For this reason Patient came into the ED where he got IV Solumedrol and Albuterol    Patient was interviewed, he states that he still has dyspnea and he also has some anxiety. He denies fever

## 2024-08-22 NOTE — CARE UPDATE
08/22/24 0751   Patient Assessment/Suction   Level of Consciousness (AVPU) alert   Respiratory Effort Normal;Unlabored   Expansion/Accessory Muscles/Retractions no use of accessory muscles;no retractions;expansion symmetric   All Lung Fields Breath Sounds Anterior:   HENRIQUE Breath Sounds wheezes, expiratory   RUL Breath Sounds wheezes, expiratory   Rhythm/Pattern, Respiratory unlabored   Cough Frequency infrequent   Cough Type good;dry   PRE-TX-O2   Device (Oxygen Therapy) nasal cannula   $ Is the patient on Low Flow Oxygen? Yes   Flow (L/min) (Oxygen Therapy) 3  (wean from 4.5)   SpO2 97 %   Pulse Oximetry Type Intermittent   $ Pulse Oximetry - Multiple Charge Pulse Oximetry - Multiple   Pulse 87   Resp (!) 22   Aerosol Therapy   $ Aerosol Therapy Charges Aerosol Treatment   Daily Review of Necessity (SVN) completed   Respiratory Treatment Status (SVN) given   Treatment Route (SVN) mask;oxygen   Patient Position HOB elevated   Post Treatment Assessment (SVN) increased aeration   Signs of Intolerance (SVN) none   Breath Sounds Post-Respiratory Treatment   Throughout All Fields Post-Treatment All Fields   Post-treatment Heart Rate (beats/min) 85   Post-treatment Resp Rate (breaths/min) 20   Education   $ Education Oxygen;15 min;Bronchodilator

## 2024-08-22 NOTE — PLAN OF CARE
Problem: Adult Inpatient Plan of Care  Goal: Plan of Care Review  Outcome: Progressing     Problem: COPD (Chronic Obstructive Pulmonary Disease)  Goal: Optimal Chronic Illness Coping  Outcome: Progressing     Problem: Diabetes Comorbidity  Goal: Blood Glucose Level Within Targeted Range  Outcome: Progressing

## 2024-08-22 NOTE — SUBJECTIVE & OBJECTIVE
Past Medical History:   Diagnosis Date    Acid reflux     Addiction to drug     Anxiety     Depression     Diabetes mellitus, type 2     History of psychiatric hospitalization     HIV infection     Hx of psychiatric care     Tori     Psychiatric exam requested by authority     Psychiatric problem     Substance abuse     Suicide attempt     Therapy        Past Surgical History:   Procedure Laterality Date    LEG SURGERY      ORIF MANDIBULAR FRACTURE Left 2022    Procedure: ORIF, FRACTURE, MANDIBLE Reviewed by ROHITH 22 1201;  Surgeon: Trenton Drew DDS, MD;  Location[facility];  Service: Oral Surgery;  Laterality: Left;       Review of patient's allergies indicates:   Allergen Reactions    Tramadol Diarrhea       Current Facility-Administered Medications on File Prior to Encounter   Medication    albuterol-ipratropium 2.5 mg-0.5 mg/3 mL nebulizer solution 3 mL    sodium chloride 0.9% flush 3 mL     Current Outpatient Medications on File Prior to Encounter   Medication Sig    albuterol (PROVENTIL/VENTOLIN HFA) 90 mcg/actuation inhaler Inhale 1-2 puffs into the lungs every 6 (six) hours as needed for Wheezing. Rescue    atorvastatin (LIPITOR) 10 MG tablet Take 10 mg by mouth once daily.    jdqqtoiwf-jdunvccx-yfkczss ala (BIKTARVY) -25 mg (25 kg or greater) Take 1 tablet by mouth once daily.    clotrimazole (LOTRIMIN) 1 % cream Apply 1 application  topically 2 (two) times daily.    ibuprofen (ADVIL,MOTRIN) 800 MG tablet Take 800 mg by mouth every 8 (eight) hours as needed for Pain.    metFORMIN (GLUCOPHAGE) 500 MG tablet Take 500 mg by mouth once daily.    prazosin (MINIPRESS) 1 MG Cap Take 1 mg by mouth every evening.    pregabalin (LYRICA) 75 MG capsule Take 75 mg by mouth 2 (two) times daily.    tiZANidine (ZANAFLEX) 4 MG tablet Take 4 mg by mouth nightly as needed.    VIVITROL 380 mg SSRR kit SMARTSI Each IM Once a Month    [DISCONTINUED] FLUoxetine 20 MG capsule Take 1 capsule (20 mg total) by  mouth once daily.    [DISCONTINUED] omeprazole (PRILOSEC) 20 MG capsule Take 20 mg by mouth once daily.    [DISCONTINUED] OXcarbazepine (TRILEPTAL) 150 MG Tab Take 1 tablet (150 mg total) by mouth 2 (two) times daily.    [DISCONTINUED] pantoprazole (PROTONIX) 40 MG tablet Take 1 tablet (40 mg total) by mouth before breakfast.    [DISCONTINUED] traZODone (DESYREL) 50 MG tablet Take 1 tablet (50 mg total) by mouth nightly as needed for Insomnia.     Family History       Problem Relation (Age of Onset)    Colon cancer Mother    No Known Problems Father          Tobacco Use    Smoking status: Every Day     Current packs/day: 1.00     Average packs/day: 1 pack/day for 20.6 years (20.6 ttl pk-yrs)     Types: Cigarettes     Start date: 1/1/2004    Smokeless tobacco: Never    Tobacco comments:     Patient declined nicotine patches   Substance and Sexual Activity    Alcohol use: Not Currently    Drug use: Not Currently     Comment: currently in a [employer] program, not using that anymore    Sexual activity: Not Currently     Review of Systems   Constitutional:  Negative for activity change, appetite change, fatigue and fever.   HENT:  Negative for rhinorrhea, sinus pressure and sinus pain.    Eyes:  Negative for photophobia, redness and visual disturbance.   Respiratory:  Positive for cough, chest tightness, shortness of breath and wheezing. Negative for choking.    Gastrointestinal:  Negative for abdominal pain, diarrhea and vomiting.   Endocrine: Negative for polydipsia, polyphagia and polyuria.   Genitourinary:  Negative for difficulty urinating, dysuria and hematuria.   Musculoskeletal:  Negative for arthralgias, back pain and joint swelling.   Skin:  Negative for pallor, rash and wound.   Neurological:  Negative for dizziness, seizures and numbness.   Psychiatric/Behavioral:  Positive for agitation. Negative for sleep disturbance. The patient is nervous/anxious.      Objective:     Vital Signs (Most Recent):  Temp: 97.3  °F (36.3 °C) (08/22/24 0415)  Pulse: 104 (08/22/24 0415)  Resp: 19 (08/22/24 0415)  BP: 109/77 (08/22/24 0415)  SpO2: (!) 93 % (08/22/24 0415) Vital Signs (24h Range):  Temp:  [97.3 °F (36.3 °C)-98.4 °F (36.9 °C)] 97.3 °F (36.3 °C)  Pulse:  [] 104  Resp:  [17-33] 19  SpO2:  [89 %-100 %] 93 %  BP: (109-154)/(77-92) 109/77     Weight: 79.4 kg (175 lb)  Body mass index is 23.73 kg/m².     Physical Exam  HENT:      Head: Normocephalic and atraumatic.      Right Ear: External ear normal.      Left Ear: External ear normal.      Nose: No congestion or rhinorrhea.   Cardiovascular:      Rate and Rhythm: Normal rate and regular rhythm.   Pulmonary:      Effort: No respiratory distress.      Breath sounds: No wheezing or rales.   Abdominal:      General: There is no distension.      Palpations: There is no mass.      Tenderness: There is no abdominal tenderness. There is no guarding or rebound.      Hernia: No hernia is present.   Musculoskeletal:         General: No swelling or deformity.      Right lower leg: No edema.   Skin:     Findings: No bruising or lesion.   Neurological:      Mental Status: He is alert.      Motor: No weakness.                Significant Labs: All pertinent labs within the past 24 hours have been reviewed.  ABGs:   Recent Labs   Lab 08/21/24  1703   PH 7.327*   PCO2 51.1*   HCO3 26.7   POCSATURATED 98   BE 1   PO2 110*     BMP:   Recent Labs   Lab 08/21/24  1735   *      K 3.8      CO2 27   BUN 17   CREATININE 1.0   CALCIUM 8.3*   MG 2.6     CBC:   Recent Labs   Lab 08/21/24  1641 08/21/24  1703   WBC 4.04  --    HGB 12.6*  --    HCT 39.8* 41     --      CMP:   Recent Labs   Lab 08/21/24  1735      K 3.8      CO2 27   *   BUN 17   CREATININE 1.0   CALCIUM 8.3*   PROT 6.4   ALBUMIN 3.9   BILITOT 0.3   ALKPHOS 38*   AST 23   ALT 15   ANIONGAP 6*       Significant Imaging: I have reviewed all pertinent imaging results/findings within the past 24  hours.

## 2024-08-22 NOTE — CARE UPDATE
08/22/24 0244   Patient Assessment/Suction   Level of Consciousness (AVPU) alert   Respiratory Effort Unlabored;Normal   Expansion/Accessory Muscles/Retractions no retractions;no use of accessory muscles   HENRIQUE Breath Sounds clear   LLL Breath Sounds diminished   RUL Breath Sounds clear   RML Breath Sounds diminished   RLL Breath Sounds diminished   Rhythm/Pattern, Respiratory unlabored;depth regular;no shortness of breath reported   Cough Frequency frequent   Cough Type good;dry;nonproductive   PRE-TX-O2   Device (Oxygen Therapy) nasal cannula   $ Is the patient on Low Flow Oxygen? Yes   Flow (L/min) (Oxygen Therapy) 4.5   SpO2 (!) 92 %   Pulse Oximetry Type Continuous   $ Pulse Oximetry - Multiple Charge Pulse Oximetry - Multiple   Aerosol Therapy   $ Aerosol Therapy Charges Aerosol Treatment  (prn treatment given)   Daily Review of Necessity (SVN) completed   Respiratory Treatment Status (SVN) given   Treatment Route (SVN) mask;oxygen   Patient Position HOB elevated   Post Treatment Assessment (SVN) increased aeration   Signs of Intolerance (SVN) none

## 2024-08-23 VITALS
OXYGEN SATURATION: 99 % | RESPIRATION RATE: 18 BRPM | SYSTOLIC BLOOD PRESSURE: 116 MMHG | HEIGHT: 72 IN | WEIGHT: 175 LBS | HEART RATE: 73 BPM | DIASTOLIC BLOOD PRESSURE: 62 MMHG | BODY MASS INDEX: 23.7 KG/M2 | TEMPERATURE: 99 F

## 2024-08-23 LAB
ALBUMIN SERPL BCP-MCNC: 3.8 G/DL (ref 3.5–5.2)
ALP SERPL-CCNC: 38 U/L (ref 55–135)
ALT SERPL W/O P-5'-P-CCNC: 16 U/L (ref 10–44)
ANION GAP SERPL CALC-SCNC: 6 MMOL/L (ref 8–16)
AST SERPL-CCNC: 22 U/L (ref 10–40)
BASOPHILS # BLD AUTO: 0.01 K/UL (ref 0–0.2)
BASOPHILS NFR BLD: 0.1 % (ref 0–1.9)
BILIRUB SERPL-MCNC: 0.3 MG/DL (ref 0.1–1)
BUN SERPL-MCNC: 26 MG/DL (ref 6–20)
CALCIUM SERPL-MCNC: 8.5 MG/DL (ref 8.7–10.5)
CHLORIDE SERPL-SCNC: 105 MMOL/L (ref 95–110)
CO2 SERPL-SCNC: 29 MMOL/L (ref 23–29)
CREAT SERPL-MCNC: 1 MG/DL (ref 0.5–1.4)
DIFFERENTIAL METHOD BLD: ABNORMAL
EOSINOPHIL # BLD AUTO: 0 K/UL (ref 0–0.5)
EOSINOPHIL NFR BLD: 0.3 % (ref 0–8)
ERYTHROCYTE [DISTWIDTH] IN BLOOD BY AUTOMATED COUNT: 16.8 % (ref 11.5–14.5)
EST. GFR  (NO RACE VARIABLE): >60 ML/MIN/1.73 M^2
GLUCOSE SERPL-MCNC: 101 MG/DL (ref 70–110)
GLUCOSE SERPL-MCNC: 105 MG/DL (ref 70–110)
GLUCOSE SERPL-MCNC: 89 MG/DL (ref 70–110)
HCT VFR BLD AUTO: 35.6 % (ref 40–54)
HGB BLD-MCNC: 11.6 G/DL (ref 14–18)
IMM GRANULOCYTES # BLD AUTO: 0.02 K/UL (ref 0–0.04)
IMM GRANULOCYTES NFR BLD AUTO: 0.3 % (ref 0–0.5)
LYMPHOCYTES # BLD AUTO: 1.4 K/UL (ref 1–4.8)
LYMPHOCYTES NFR BLD: 18.9 % (ref 18–48)
MAGNESIUM SERPL-MCNC: 2.2 MG/DL (ref 1.6–2.6)
MCH RBC QN AUTO: 24.1 PG (ref 27–31)
MCHC RBC AUTO-ENTMCNC: 32.6 G/DL (ref 32–36)
MCV RBC AUTO: 74 FL (ref 82–98)
MONOCYTES # BLD AUTO: 0.7 K/UL (ref 0.3–1)
MONOCYTES NFR BLD: 9.5 % (ref 4–15)
NEUTROPHILS # BLD AUTO: 5.3 K/UL (ref 1.8–7.7)
NEUTROPHILS NFR BLD: 70.9 % (ref 38–73)
NRBC BLD-RTO: 0 /100 WBC
PHOSPHATE SERPL-MCNC: 3.4 MG/DL (ref 2.7–4.5)
PLATELET # BLD AUTO: 203 K/UL (ref 150–450)
PMV BLD AUTO: 11.1 FL (ref 9.2–12.9)
POTASSIUM SERPL-SCNC: 4 MMOL/L (ref 3.5–5.1)
PROT SERPL-MCNC: 6.2 G/DL (ref 6–8.4)
RBC # BLD AUTO: 4.81 M/UL (ref 4.6–6.2)
SODIUM SERPL-SCNC: 140 MMOL/L (ref 136–145)
WBC # BLD AUTO: 7.5 K/UL (ref 3.9–12.7)

## 2024-08-23 PROCEDURE — 84100 ASSAY OF PHOSPHORUS: CPT | Performed by: STUDENT IN AN ORGANIZED HEALTH CARE EDUCATION/TRAINING PROGRAM

## 2024-08-23 PROCEDURE — 99900031 HC PATIENT EDUCATION (STAT)

## 2024-08-23 PROCEDURE — G0378 HOSPITAL OBSERVATION PER HR: HCPCS

## 2024-08-23 PROCEDURE — 63700000 PHARM REV CODE 250 ALT 637 W/O HCPCS: Performed by: STUDENT IN AN ORGANIZED HEALTH CARE EDUCATION/TRAINING PROGRAM

## 2024-08-23 PROCEDURE — 25000242 PHARM REV CODE 250 ALT 637 W/ HCPCS: Performed by: STUDENT IN AN ORGANIZED HEALTH CARE EDUCATION/TRAINING PROGRAM

## 2024-08-23 PROCEDURE — 83735 ASSAY OF MAGNESIUM: CPT | Performed by: STUDENT IN AN ORGANIZED HEALTH CARE EDUCATION/TRAINING PROGRAM

## 2024-08-23 PROCEDURE — 25000003 PHARM REV CODE 250: Performed by: STUDENT IN AN ORGANIZED HEALTH CARE EDUCATION/TRAINING PROGRAM

## 2024-08-23 PROCEDURE — 80053 COMPREHEN METABOLIC PANEL: CPT | Performed by: STUDENT IN AN ORGANIZED HEALTH CARE EDUCATION/TRAINING PROGRAM

## 2024-08-23 PROCEDURE — 94640 AIRWAY INHALATION TREATMENT: CPT

## 2024-08-23 PROCEDURE — 63600175 PHARM REV CODE 636 W HCPCS: Performed by: STUDENT IN AN ORGANIZED HEALTH CARE EDUCATION/TRAINING PROGRAM

## 2024-08-23 PROCEDURE — 94761 N-INVAS EAR/PLS OXIMETRY MLT: CPT

## 2024-08-23 PROCEDURE — 36415 COLL VENOUS BLD VENIPUNCTURE: CPT | Performed by: STUDENT IN AN ORGANIZED HEALTH CARE EDUCATION/TRAINING PROGRAM

## 2024-08-23 PROCEDURE — 85025 COMPLETE CBC W/AUTO DIFF WBC: CPT | Performed by: STUDENT IN AN ORGANIZED HEALTH CARE EDUCATION/TRAINING PROGRAM

## 2024-08-23 RX ORDER — FLUTICASONE PROPIONATE AND SALMETEROL 100; 50 UG/1; UG/1
1 POWDER RESPIRATORY (INHALATION) 2 TIMES DAILY
Qty: 60 EACH | Refills: 0 | Status: SHIPPED | OUTPATIENT
Start: 2024-08-23 | End: 2024-09-22

## 2024-08-23 RX ORDER — ALBUTEROL SULFATE 90 UG/1
1-2 INHALANT RESPIRATORY (INHALATION) EVERY 6 HOURS PRN
Qty: 18 G | Refills: 1 | Status: SHIPPED | OUTPATIENT
Start: 2024-08-23 | End: 2025-08-23

## 2024-08-23 RX ADMIN — IPRATROPIUM BROMIDE AND ALBUTEROL SULFATE 3 ML: 2.5; .5 SOLUTION RESPIRATORY (INHALATION) at 07:08

## 2024-08-23 RX ADMIN — AZITHROMYCIN DIHYDRATE 250 MG: 250 TABLET ORAL at 09:08

## 2024-08-23 RX ADMIN — SULFAMETHOXAZOLE AND TRIMETHOPRIM 1 TABLET: 800; 160 TABLET ORAL at 09:08

## 2024-08-23 RX ADMIN — BICTEGRAVIR SODIUM, EMTRICITABINE, AND TENOFOVIR ALAFENAMIDE FUMARATE 1 TABLET: 50; 200; 25 TABLET ORAL at 09:08

## 2024-08-23 RX ADMIN — PREGABALIN 75 MG: 75 CAPSULE ORAL at 09:08

## 2024-08-23 RX ADMIN — IPRATROPIUM BROMIDE AND ALBUTEROL SULFATE 3 ML: 2.5; .5 SOLUTION RESPIRATORY (INHALATION) at 11:08

## 2024-08-23 RX ADMIN — PREDNISONE 40 MG: 20 TABLET ORAL at 09:08

## 2024-08-23 NOTE — CARE UPDATE
08/23/24 0727   Patient Assessment/Suction   Level of Consciousness (AVPU) alert   Respiratory Effort Unlabored   Expansion/Accessory Muscles/Retractions expansion symmetric;no retractions;no use of accessory muscles   All Lung Fields Breath Sounds wheezes, expiratory   Rhythm/Pattern, Respiratory depth regular;unlabored;pattern regular   PRE-TX-O2   Device (Oxygen Therapy) room air   SpO2 97 %   Pulse Oximetry Type Intermittent   $ Pulse Oximetry - Multiple Charge Pulse Oximetry - Multiple   Pulse 69   Resp 20   Aerosol Therapy   $ Aerosol Therapy Charges Aerosol Treatment   Daily Review of Necessity (SVN) completed   Respiratory Treatment Status (SVN) given   Treatment Route (SVN) mask   Patient Position HOB elevated   Post Treatment Assessment (SVN) increased aeration   Signs of Intolerance (SVN) none   Breath Sounds Post-Respiratory Treatment   Throughout All Fields Post-Treatment All Fields   Throughout All Fields Post-Treatment Anterior:;Lateral:;aeration increased   Post-treatment Heart Rate (beats/min) 70   Post-treatment Resp Rate (breaths/min) 20   Education   $ Education Bronchodilator;15 min

## 2024-08-23 NOTE — PLAN OF CARE
Patient cleared for discharge by case management to home, no needs.    [facility] will [facility] to pick him up.       08/23/24 1140   Final Note   Assessment Type Final Discharge Note   Anticipated Discharge Disposition Home   Hospital Resources/Appts/Education Provided Appointments scheduled and added to AVS

## 2024-08-23 NOTE — ASSESSMENT & PLAN NOTE
Patient's COPD is with exacerbation noted by worsening of baseline hypoxia currently.  Patient is currently on COPD Pathway. Continue scheduled inhalers Steroids, Antibiotics, and Supplemental oxygen and monitor respiratory status closely.   Rapidly improved, on room air satting 98%   We will be discharged with prescription for Dulera and albuterol   We will follow up outpatient with pulmonology for PFTs.

## 2024-08-23 NOTE — DISCHARGE SUMMARY
[facility] Medicine  Discharge Summary      Patient Name: Vinicius Atkinson  MRN: [id number]  [facility]: [id number]  Patient Class: OP- Observation  Admission Date: 8/21/2024  Hospital Length of Stay: 1 days  Discharge Date and Time:  08/23/2024 3:09 PM  Attending Physician: Bernabe Adkins DO   Discharging Provider: Bernabe Adkins DO  Primary Care Provider: [facility]    Primary Care Team: Networked reference to record PCT     HPI:   Patient is a 60yo with a PMH of COPD, smoking & HTN presented to the ED for Hypoxia. Patient states that he ran out of inhalers and he was smoking. Patient began having wheezing, phlegmy cough & dyspnea. This similar to prior COPD Excerebration Patient had. For this reason Patient came into the ED where he got IV Solumedrol and Albuterol    Patient was interviewed, he states that he still has dyspnea and he also has some anxiety. He denies fever    * No surgery found *      Hospital Course:   Patient was treated with Bactrim, azithromycin, DuoNebs, oral prednisone 40 mg daily.  Home Biktarvy was continued.  Patient was initially on 3 L O2 via nasal cannula, but improved rapidly and was weaned to room air, satting 98%.  Patient has never been officially diagnosed with COPD.  At discharge we will have prescription for Dulera as well as new prescription for albuterol.  Outpatient appointment will be made to follow up with pulmonology for PFTs.  Patient has remained hemodynamically stable, and we will be discharged.     Goals of Care Treatment Preferences:  Code Status: Full Code      SDOH Screening:  The patient was screened for utility difficulties, food insecurity, transport difficulties, housing insecurity, and interpersonal safety and there were no concerns identified this admission.     Consults:     Pulmonary  * COPD exacerbation  Patient's COPD is with exacerbation noted by worsening of baseline hypoxia currently.  Patient is currently on  COPD Pathway. Continue scheduled inhalers Steroids, Antibiotics, and Supplemental oxygen and monitor respiratory status closely.   Rapidly improved, on room air satting 98%   We will be discharged with prescription for Dulera and albuterol   We will follow up outpatient with pulmonology for PFTs.      Final Active Diagnoses:    Diagnosis Date Noted POA    PRINCIPAL PROBLEM:  COPD exacerbation [J44.1] 08/22/2024 Yes    Anxiety [F41.9] 08/22/2024 Yes    HIV infection [B20] 09/14/2023 Yes    Type 2 diabetes mellitus, without long-term current use of insulin [E11.9] 06/14/2021 Yes      Problems Resolved During this Admission:       Discharged Condition: good    Disposition: Home or Self Care    Follow Up:   Follow-up Information       [facility] Follow up on 9/3/2024.    Specialties: Internal Medicine, Family Medicine  Why: @ 2:20 pm  Contact information:  [address]  [facility] [address]  [phone]                           Patient Instructions:      Ambulatory referral/consult to Pulmonology   Standing Status: Future   Referral Priority: Routine Referral Type: Consultation   Referral Reason: Specialty Services Required   Requested Specialty: Pulmonary Disease   Number of Visits Requested: 1     Ambulatory referral/consult to Smoking Cessation Program   Standing Status: Future   Referral Priority: Routine Referral Type: Consultation   Referral Reason: Specialty Services Required   Requested Specialty: CTTS   Number of Visits Requested: 1     Ambulatory referral/consult to Outpatient Case Management   Referral Priority: Routine Referral Type: Consultation   Referral Reason: Specialty Services Required   Number of Visits Requested: 1     Ambulatory referral/consult to [facility]   Standing Status: Future   Referral Priority: Routine Referral Type: Consultation   Referral Reason: Specialty Services Required   Number of Visits Requested: 1     Notify your health care  provider if you experience any of the following:  temperature >100.4     Notify your health care provider if you experience any of the following:  increased confusion or weakness     Notify your health care provider if you experience any of the following:  persistent dizziness, light-headedness, or visual disturbances     Notify your health care provider if you experience any of the following:  worsening rash     Notify your health care provider if you experience any of the following:  severe persistent headache     Notify your health care provider if you experience any of the following:  difficulty breathing or increased cough     Notify your health care provider if you experience any of the following:  redness, tenderness, or signs of infection (pain, swelling, redness, odor or green/yellow discharge around incision site)     Notify your health care provider if you experience any of the following:  severe uncontrolled pain     Notify your health care provider if you experience any of the following:  persistent nausea and vomiting or diarrhea     Activity as tolerated       Significant Diagnostic Studies: Labs: CMP   Recent Labs   Lab 08/21/24  1735 08/22/24  0517 08/23/24  0550    140 140   K 3.8 4.0 4.0    107 105   CO2 27 25 29   * 168* 101   BUN 17 19 26*   CREATININE 1.0 0.8 1.0   CALCIUM 8.3* 8.6* 8.5*   PROT 6.4 6.4 6.2   ALBUMIN 3.9 4.0 3.8   BILITOT 0.3 0.3 0.3   ALKPHOS 38* 41* 38*   AST 23 20 22   ALT 15 15 16   ANIONGAP 6* 8 6*    and CBC   Recent Labs   Lab 08/21/24  1641 08/21/24  1703 08/22/24  0508 08/23/24  0551   WBC 4.04  --  4.85 7.50   HGB 12.6*  --  12.0* 11.6*   HCT 39.8*   < > 36.8* 35.6*     --  209 203    < > = values in this interval not displayed.     Radiology: X-Ray: CXR: X-Ray Chest 1 View (CXR): No results found for this visit on 08/21/24. and X-Ray Chest PA and Lateral (CXR): No results found for this visit on 08/21/24. and KUB: X-Ray Abdomen AP 1 View  (KUB): No results found for this visit on 24.  CT scan: CT ABDOMEN PELVIS WITH CONTRAST: No results found for this visit on 24. and CT ABDOMEN PELVIS WITHOUT CONTRAST: No results found for this visit on 24.    Pending Diagnostic Studies:       None           Medications:  Reconciled Home Medications:      Medication List        START taking these medications      fluticasone-salmeterol 100-50 mcg/dose 100-50 mcg/dose diskus inhaler  Commonly known as: ADVAIR  Inhale 1 puff into the lungs 2 (two) times daily. Controller            CONTINUE taking these medications      albuterol 90 mcg/actuation inhaler  Commonly known as: PROVENTIL/VENTOLIN HFA  Inhale 1-2 puffs into the lungs every 6 (six) hours as needed for Wheezing. Rescue     atorvastatin 10 MG tablet  Commonly known as: LIPITOR  Take 10 mg by mouth once daily.     jwckkerjs-yvtzveiv-bzrrjre ala -25 mg (25 kg or greater)  Commonly known as: BIKTARVY  Take 1 tablet by mouth once daily.     clotrimazole 1 % cream  Commonly known as: LOTRIMIN  Apply 1 application  topically 2 (two) times daily.     ibuprofen 800 MG tablet  Commonly known as: ADVIL,MOTRIN  Take 800 mg by mouth every 8 (eight) hours as needed for Pain.     metFORMIN 500 MG tablet  Commonly known as: GLUCOPHAGE  Take 500 mg by mouth once daily.     prazosin 1 MG Cap  Commonly known as: MINIPRESS  Take 1 mg by mouth every evening.     pregabalin 75 MG capsule  Commonly known as: LYRICA  Take 75 mg by mouth 2 (two) times daily.     tiZANidine 4 MG tablet  Commonly known as: ZANAFLEX  Take 4 mg by mouth nightly as needed.     VivitroL 380 mg Ssrr kit  Generic drug: naltrexone microspheres  SMARTSI Each IM Once a Month              Indwelling Lines/Drains at time of discharge:   Lines/Drains/Airways       None                   Time spent on the discharge of patient: 35 minutes         Bernabe Adkins DO  Department of Hospital Medicine  [facility]

## 2024-08-23 NOTE — PLAN OF CARE
Problem: Adult Inpatient Plan of Care  Goal: Plan of Care Review  Outcome: Progressing  Goal: Patient-Specific Goal (Individualized)  Outcome: Progressing  Goal: Absence of Hospital-Acquired Illness or Injury  Outcome: Progressing  Goal: Optimal Comfort and Wellbeing  Outcome: Progressing  Goal: Readiness for Transition of Care  Outcome: Progressing     Problem: COPD (Chronic Obstructive Pulmonary Disease)  Goal: Optimal Chronic Illness Coping  Outcome: Progressing  Goal: Optimal Level of Functional Cole  Outcome: Progressing  Goal: Absence of Infection Signs and Symptoms  Outcome: Progressing  Goal: Improved Oral Intake  Outcome: Progressing  Goal: Effective Oxygenation and Ventilation  Outcome: Progressing     Problem: Diabetes Comorbidity  Goal: Blood Glucose Level Within Targeted Range  Outcome: Progressing

## 2024-08-23 NOTE — HOSPITAL COURSE
Patient was treated with Bactrim, azithromycin, DuoNebs, oral prednisone 40 mg daily.  Home Biktarvy was continued.  Patient was initially on 3 L O2 via nasal cannula, but improved rapidly and was weaned to room air, satting 98%.  Patient has never been officially diagnosed with COPD.  At discharge we will have prescription for Dulera as well as new prescription for albuterol.  Outpatient appointment will be made to follow up with pulmonology for PFTs.  Patient has remained hemodynamically stable, and we will be discharged.

## 2024-08-28 ENCOUNTER — PATIENT OUTREACH (OUTPATIENT)
Dept: ADMINISTRATIVE | Facility: OTHER | Age: 59
End: 2024-08-28
Payer: MEDICAID

## 2024-09-04 NOTE — PROGRESS NOTES
LPN spoke to patient/caregiver as per OPCM referral for: housing    Does the patient consent to completing the assessment/enrollment: Yes  Does the patient consent for LPN to speak to a caregiver? No    Health Insurance Coverage:     Does the patient have adequate health insurance coverage? Yes  Education provided: Yes    PCP Follow-Up Appointments:    Does the patient have a primary care provider? no  Date of last PCP appointment?   Next PCP appointment: N/A  Was patient provided with education surrounding PCP services/creating a medical home? yes -       Specialist Appointments:     Does the patient have a pending specialist referral? yes - pulm  Does the patient have an upcoming specialist appointment? yes - smoking cessation  Is the patient pregnant? N/A  Does the patient have a mental health provider? no       Home Medications:     Reviewed medication list with patient? No  Is the patient able to afford their medications? Yes        Recent lab results:  Blood Sugar:    Provided education: No  Blood Pressure:   Provided education: No        Social Determinants of Health (SDOH)    Patient's identified areas of need:      Education/Resources provided:          Home Health/DME:    Current Home Health: No  Patient has all healthcare equipment/supplies indicated: yes  Current DME:       Additional Documentation:   Spoke to patient based on OPCM referral. States staying at [facility] currently. Has income, disability check monthly. Will find low income housing resources for pt. States he would like to try the [place] area. Referred to pulm, no appt yet. Pt unable to attend pcp f/u due to lack of transportation. Discussed medicaid transp with pt. Will f/u on appt at next call.       Follow up:   Patient agrees to scheduled follow up call.

## 2024-09-05 ENCOUNTER — PATIENT OUTREACH (OUTPATIENT)
Dept: ADMINISTRATIVE | Facility: OTHER | Age: 59
End: 2024-09-05
Payer: MEDICAID

## 2024-09-12 NOTE — PROGRESS NOTES
CHW - Follow Up    This LPN completed a follow up visit with patient via telephone today.  Pt/Caregiver reported:   Community Health Worker provided: Spoke to patient to provide info for [employer] [place] and [place]. He states he is going to [employer] tomorrow. Will f/u next week.   Follow up required:   Follow-up Outreach - Due: 9/19/2024

## 2024-09-18 ENCOUNTER — CLINICAL SUPPORT (OUTPATIENT)
Dept: SMOKING CESSATION | Facility: CLINIC | Age: 59
End: 2024-09-18
Payer: MEDICAID

## 2024-09-18 DIAGNOSIS — F17.200 NICOTINE DEPENDENCE: Primary | ICD-10-CM

## 2024-09-18 PROCEDURE — 99404 PREV MED CNSL INDIV APPRX 60: CPT | Mod: S$PBB,,, | Performed by: FAMILY MEDICINE

## 2024-09-18 PROCEDURE — 99999 PR PBB SHADOW E&M-EST. PATIENT-LVL I: CPT | Mod: PBBFAC,,,

## 2024-09-18 RX ORDER — IBUPROFEN 200 MG
1 TABLET ORAL DAILY
Qty: 14 PATCH | Refills: 0 | Status: SHIPPED | OUTPATIENT
Start: 2024-09-18

## 2024-09-18 NOTE — Clinical Note
Patient will be participating in bi-weekly tobacco cessation sessions and will begin the prescribed tobacco cessation medication of 21 mg nicotine patch daily. Patient states smoking about 0.5 ppd of cigarettes.

## 2024-09-19 ENCOUNTER — PATIENT OUTREACH (OUTPATIENT)
Dept: ADMINISTRATIVE | Facility: OTHER | Age: 59
End: 2024-09-19
Payer: MEDICAID

## 2024-09-19 NOTE — PROGRESS NOTES
CHW - Case Closure    This LPN spoke to patient via telephone today.   Pt/Caregiver reported: Spoke to patient states working with [employer] on housing. Denies other needs at this time.   Pt/Caregiver denied any additional needs at this time and agrees with episode closure at this time.  Provided patient with Community Health Worker's contact information and encouraged him/her to contact this Community Health Worker if additional needs arise.

## 2024-10-08 ENCOUNTER — CLINICAL SUPPORT (OUTPATIENT)
Dept: SMOKING CESSATION | Facility: CLINIC | Age: 59
End: 2024-10-08
Payer: MEDICAID

## 2024-10-08 DIAGNOSIS — F17.200 NICOTINE DEPENDENCE: Primary | ICD-10-CM

## 2024-10-08 PROCEDURE — 99999 PR PBB SHADOW E&M-EST. PATIENT-LVL I: CPT | Mod: PBBFAC,,,

## 2024-10-08 NOTE — PROGRESS NOTES
Individual Follow-Up Form    10/8/2024    Quit Date:     Clinical Status of Patient: Outpatient    Length of Service: 60 minutes    Continuing Medication: yes  Patches    Other Medications:      Target Symptoms: Withdrawal and medication side effects. The following were  rated moderate (3) to severe (4) on TCRS:  Moderate (3): none  Severe (4): none    Comments: Patient seen in clinic today, he states smoking 4-5 cpd. Patient remains on prescribed tobacco cessation medication regimen of 21 mg patch without any negative side effects at this time. No refill needed at this time. The patient denies any abnormal behavioral or mental changes at this time. Reviewed the use of the nicotine patch due to patient taking it off to smoke, he verbalized understanding. Discussed strategies, cues, and triggers. Introduced the negative impact of tobacco on health, the health advantages of discontinuing the use of tobacco, time line improved health changes after a quit, withdrawal issues to expect from nicotine and habit, and ways to achieve the goal of a quit. The patient will continue with  therapy sessions and medication monitoring by CTTS. Prescribed medication management will be by physician.            Diagnosis: F17.200    Next Visit:

## 2024-11-13 ENCOUNTER — CLINICAL SUPPORT (OUTPATIENT)
Dept: SMOKING CESSATION | Facility: CLINIC | Age: 59
End: 2024-11-13
Payer: MEDICAID

## 2024-11-13 DIAGNOSIS — F17.200 NICOTINE DEPENDENCE: Primary | ICD-10-CM

## 2024-11-13 PROCEDURE — 99999 PR PBB SHADOW E&M-EST. PATIENT-LVL I: CPT | Mod: PBBFAC,,,

## 2024-11-13 NOTE — Clinical Note
Patient states smoking about 6 cpd. Patient remains on prescribed tobacco cessation medication regimen of 21 mg patch without any negative side effects at this time. No refill needed at this time. The patient denies any abnormal behavioral or mental changes at this time. Encouraged patient to try a rate reduction of about 2 cpd. Discussed and reviewed strategies, controlling environment, cues, triggers, new goals set. Patient states having a quit date by end of the year. The patient will continue with therapy sessions and medication monitoring by CTTS. Prescribed medication management will be by physician.

## 2024-11-13 NOTE — PROGRESS NOTES
Individual Follow-Up Form    11/13/2024    Quit Date:     Clinical Status of Patient: Outpatient    Length of Service: 30 minutes    Continuing Medication: yes  Patches    Other Medications:      Target Symptoms: Withdrawal and medication side effects. The following were  rated moderate (3) to severe (4) on TCRS:  Moderate (3): none  Severe (4): none    Comments: Patient states smoking about 6 cpd. Patient remains on prescribed tobacco cessation medication regimen of 21 mg patch without any negative side effects at this time. No refill needed at this time. The patient denies any abnormal behavioral or mental changes at this time. Encouraged patient to try a rate reduction of about 2 cpd. Discussed and reviewed strategies, controlling environment, cues, triggers, new goals set. Patient states having a quit date by end of the year. The patient will continue with therapy sessions and medication monitoring by CTTS. Prescribed medication management will be by physician.        Diagnosis: F17.200    Next Visit: 12/4/2024

## 2024-12-04 ENCOUNTER — HOSPITAL ENCOUNTER (EMERGENCY)
Facility: OTHER | Age: 59
Discharge: HOME | End: 2024-12-04
Attending: EMERGENCY MEDICINE
Payer: COMMERCIAL

## 2024-12-04 VITALS
RESPIRATION RATE: 20 BRPM | OXYGEN SATURATION: 95 % | SYSTOLIC BLOOD PRESSURE: 147 MMHG | DIASTOLIC BLOOD PRESSURE: 94 MMHG | HEART RATE: 96 BPM | TEMPERATURE: 98 F

## 2024-12-04 DIAGNOSIS — F19.10 SUBSTANCE ABUSE: ICD-10-CM

## 2024-12-04 DIAGNOSIS — R74.01 TRANSAMINITIS: ICD-10-CM

## 2024-12-04 DIAGNOSIS — M79.606 LEG PAIN: ICD-10-CM

## 2024-12-04 DIAGNOSIS — S80.819A ABRASION, LEG W/O INFECTION: Primary | ICD-10-CM

## 2024-12-04 DIAGNOSIS — M54.50 ACUTE MIDLINE LOW BACK PAIN WITHOUT SCIATICA: ICD-10-CM

## 2024-12-04 DIAGNOSIS — R06.00 DYSPNEA, UNSPECIFIED TYPE: ICD-10-CM

## 2024-12-04 LAB
ABO + RH BLD: NORMAL
ALBUMIN SERPL BCP-MCNC: 4.8 G/DL (ref 3.5–5.2)
ALP SERPL-CCNC: 70 U/L (ref 40–150)
ALT SERPL W/O P-5'-P-CCNC: 225 U/L (ref 10–44)
ANION GAP SERPL CALC-SCNC: 16 MMOL/L (ref 8–16)
AST SERPL-CCNC: 658 U/L (ref 10–40)
BASOPHILS # BLD AUTO: 0.03 K/UL (ref 0–0.2)
BASOPHILS NFR BLD: 0.4 % (ref 0–1.9)
BILIRUB SERPL-MCNC: 1.1 MG/DL (ref 0.1–1)
BLD GP AB SCN CELLS X3 SERPL QL: NORMAL
BUN SERPL-MCNC: 33 MG/DL (ref 6–20)
CALCIUM SERPL-MCNC: 10 MG/DL (ref 8.7–10.5)
CHLORIDE SERPL-SCNC: 103 MMOL/L (ref 95–110)
CO2 SERPL-SCNC: 21 MMOL/L (ref 23–29)
CREAT SERPL-MCNC: 1.4 MG/DL (ref 0.5–1.4)
DIFFERENTIAL METHOD BLD: ABNORMAL
EOSINOPHIL # BLD AUTO: 0 K/UL (ref 0–0.5)
EOSINOPHIL NFR BLD: 0.1 % (ref 0–8)
ERYTHROCYTE [DISTWIDTH] IN BLOOD BY AUTOMATED COUNT: 16.8 % (ref 11.5–14.5)
EST. GFR  (NO RACE VARIABLE): 58 ML/MIN/1.73 M^2
ETHANOL SERPL-MCNC: <10 MG/DL
GLUCOSE SERPL-MCNC: 90 MG/DL (ref 70–110)
HCT VFR BLD AUTO: 46 % (ref 40–54)
HCV AB SERPL QL IA: NEGATIVE
HGB BLD-MCNC: 14.3 G/DL (ref 14–18)
IMM GRANULOCYTES # BLD AUTO: 0.04 K/UL (ref 0–0.04)
IMM GRANULOCYTES NFR BLD AUTO: 0.5 % (ref 0–0.5)
LYMPHOCYTES # BLD AUTO: 1.2 K/UL (ref 1–4.8)
LYMPHOCYTES NFR BLD: 14.6 % (ref 18–48)
MCH RBC QN AUTO: 23.6 PG (ref 27–31)
MCHC RBC AUTO-ENTMCNC: 31.1 G/DL (ref 32–36)
MCV RBC AUTO: 76 FL (ref 82–98)
MONOCYTES # BLD AUTO: 0.7 K/UL (ref 0.3–1)
MONOCYTES NFR BLD: 8.1 % (ref 4–15)
NEUTROPHILS # BLD AUTO: 6.2 K/UL (ref 1.8–7.7)
NEUTROPHILS NFR BLD: 76.3 % (ref 38–73)
NRBC BLD-RTO: 0 /100 WBC
PLATELET # BLD AUTO: 282 K/UL (ref 150–450)
PMV BLD AUTO: 10.7 FL (ref 9.2–12.9)
POTASSIUM SERPL-SCNC: 3.6 MMOL/L (ref 3.5–5.1)
PROT SERPL-MCNC: 8.6 G/DL (ref 6–8.4)
RBC # BLD AUTO: 6.06 M/UL (ref 4.6–6.2)
SODIUM SERPL-SCNC: 140 MMOL/L (ref 136–145)
SPECIMEN OUTDATE: NORMAL
WBC # BLD AUTO: 8.16 K/UL (ref 3.9–12.7)

## 2024-12-04 PROCEDURE — 86803 HEPATITIS C AB TEST: CPT | Performed by: EMERGENCY MEDICINE

## 2024-12-04 PROCEDURE — 99285 EMERGENCY DEPT VISIT HI MDM: CPT | Mod: 25

## 2024-12-04 PROCEDURE — 82077 ASSAY SPEC XCP UR&BREATH IA: CPT | Performed by: EMERGENCY MEDICINE

## 2024-12-04 PROCEDURE — 80053 COMPREHEN METABOLIC PANEL: CPT | Performed by: EMERGENCY MEDICINE

## 2024-12-04 PROCEDURE — 25500020 PHARM REV CODE 255: Performed by: EMERGENCY MEDICINE

## 2024-12-04 PROCEDURE — 25000242 PHARM REV CODE 250 ALT 637 W/ HCPCS: Performed by: EMERGENCY MEDICINE

## 2024-12-04 PROCEDURE — 86900 BLOOD TYPING SEROLOGIC ABO: CPT | Performed by: EMERGENCY MEDICINE

## 2024-12-04 PROCEDURE — 85025 COMPLETE CBC W/AUTO DIFF WBC: CPT | Performed by: EMERGENCY MEDICINE

## 2024-12-04 PROCEDURE — 94640 AIRWAY INHALATION TREATMENT: CPT

## 2024-12-04 PROCEDURE — 25000003 PHARM REV CODE 250: Performed by: EMERGENCY MEDICINE

## 2024-12-04 RX ORDER — IBUPROFEN 600 MG/1
600 TABLET ORAL
Status: COMPLETED | OUTPATIENT
Start: 2024-12-04 | End: 2024-12-04

## 2024-12-04 RX ORDER — IBUPROFEN 400 MG/1
400 TABLET ORAL EVERY 6 HOURS PRN
Qty: 20 TABLET | Refills: 0 | Status: SHIPPED | OUTPATIENT
Start: 2024-12-04

## 2024-12-04 RX ORDER — ALBUTEROL SULFATE 2.5 MG/.5ML
2.5 SOLUTION RESPIRATORY (INHALATION)
Status: COMPLETED | OUTPATIENT
Start: 2024-12-04 | End: 2024-12-04

## 2024-12-04 RX ADMIN — IBUPROFEN 600 MG: 600 TABLET, FILM COATED ORAL at 08:12

## 2024-12-04 RX ADMIN — ALBUTEROL SULFATE 2.5 MG: 2.5 SOLUTION RESPIRATORY (INHALATION) at 05:12

## 2024-12-04 RX ADMIN — IOHEXOL 75 ML: 350 INJECTION, SOLUTION INTRAVENOUS at 06:12
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
## 2024-12-04 NOTE — ED NOTES
Attempted to contact [facility] per pt request, no answer from [facility], LVM.

## 2024-12-19 ENCOUNTER — CLINICAL SUPPORT (OUTPATIENT)
Dept: SMOKING CESSATION | Facility: CLINIC | Age: 59
End: 2024-12-19
Payer: MEDICAID

## 2024-12-19 DIAGNOSIS — F17.200 NICOTINE DEPENDENCE: Primary | ICD-10-CM

## 2024-12-19 PROCEDURE — 99999 PR PBB SHADOW E&M-EST. PATIENT-LVL I: CPT | Mod: PBBFAC,,,

## 2024-12-19 NOTE — PROGRESS NOTES
Called pt to f/u on his 3 month smoking cessation quit status. Pt stated he is still smoking, but has cut back and is still using patches.  Informed him of benefit period, phone follow ups, and contact information. Will complete smart form and will continue to follow up on quit #1 episode.

## 2025-02-26 ENCOUNTER — CLINICAL SUPPORT (OUTPATIENT)
Dept: SMOKING CESSATION | Facility: CLINIC | Age: 60
End: 2025-02-26
Payer: MEDICAID

## 2025-02-26 DIAGNOSIS — F17.200 NICOTINE DEPENDENCE: Primary | ICD-10-CM

## 2025-02-26 PROCEDURE — 99999 PR PBB SHADOW E&M-EST. PATIENT-LVL I: CPT | Mod: PBBFAC,,,

## 2025-02-26 NOTE — PROGRESS NOTES
Spoke with patient today in regard to smoking cessation progress for 6 month telephone follow up. He states he is not tobacco free. Patient reports that he is living in [place] at this time, so unable to schedule an appointment. Informed patient of benefit period, future follow ups, and contact information if any further help or support is needed. Will complete smart form for 6 month follow up on Quit attempt #1.